# Patient Record
Sex: FEMALE | Race: BLACK OR AFRICAN AMERICAN | NOT HISPANIC OR LATINO | Employment: OTHER | URBAN - METROPOLITAN AREA
[De-identification: names, ages, dates, MRNs, and addresses within clinical notes are randomized per-mention and may not be internally consistent; named-entity substitution may affect disease eponyms.]

---

## 2023-04-11 LAB
ALBUMIN (MG/L) IN URINE: 10.8 MG/L
ALBUMIN/CREATININE (UG/MG) IN URINE: 20.7 UG/MG CRT (ref 0–30)
APPEARANCE, URINE: CLEAR
BACTERIA, URINE: ABNORMAL /HPF
BILIRUBIN, URINE: NEGATIVE
BLOOD, URINE: NEGATIVE
COLOR, URINE: YELLOW
CREATININE (MG/DL) IN URINE: 52.1 MG/DL (ref 20–320)
GLUCOSE, URINE: NEGATIVE MG/DL
KETONES, URINE: NEGATIVE MG/DL
LEUKOCYTE ESTERASE, URINE: NEGATIVE
NITRITE, URINE: POSITIVE
PH, URINE: 5 (ref 5–8)
PROTEIN, URINE: NEGATIVE MG/DL
RBC, URINE: <1 /HPF (ref 0–5)
SPECIFIC GRAVITY, URINE: 1.01 (ref 1–1.03)
SQUAMOUS EPITHELIAL CELLS, URINE: 2 /HPF
URIC ACID (URATE) CRYSTALS, URINE: ABNORMAL /HPF
UROBILINOGEN, URINE: <2 MG/DL (ref 0–1.9)
WBC, URINE: 2 /HPF (ref 0–5)

## 2023-04-12 LAB
ALANINE AMINOTRANSFERASE (SGPT) (U/L) IN SER/PLAS: 15 U/L (ref 7–45)
ALBUMIN (G/DL) IN SER/PLAS: 4.4 G/DL (ref 3.4–5)
ALKALINE PHOSPHATASE (U/L) IN SER/PLAS: 101 U/L (ref 33–136)
ANION GAP IN SER/PLAS: 11 MMOL/L (ref 10–20)
ASPARTATE AMINOTRANSFERASE (SGOT) (U/L) IN SER/PLAS: 15 U/L (ref 9–39)
BILIRUBIN TOTAL (MG/DL) IN SER/PLAS: 0.3 MG/DL (ref 0–1.2)
CALCIDIOL (25 OH VITAMIN D3) (NG/ML) IN SER/PLAS: 34 NG/ML
CALCIUM (MG/DL) IN SER/PLAS: 9.9 MG/DL (ref 8.6–10.6)
CARBON DIOXIDE, TOTAL (MMOL/L) IN SER/PLAS: 30 MMOL/L (ref 21–32)
CHLORIDE (MMOL/L) IN SER/PLAS: 102 MMOL/L (ref 98–107)
CHOLESTEROL (MG/DL) IN SER/PLAS: 285 MG/DL (ref 0–199)
CHOLESTEROL IN HDL (MG/DL) IN SER/PLAS: 74.5 MG/DL
CHOLESTEROL/HDL RATIO: 3.8
CREATININE (MG/DL) IN SER/PLAS: 0.82 MG/DL (ref 0.5–1.05)
ESTIMATED AVERAGE GLUCOSE FOR HBA1C: 197 MG/DL
GFR FEMALE: 73 ML/MIN/1.73M2
GLUCOSE (MG/DL) IN SER/PLAS: 159 MG/DL (ref 74–99)
HEMOGLOBIN A1C/HEMOGLOBIN TOTAL IN BLOOD: 8.5 %
LDL: 192 MG/DL (ref 0–99)
POTASSIUM (MMOL/L) IN SER/PLAS: 4.3 MMOL/L (ref 3.5–5.3)
PROTEIN TOTAL: 7.8 G/DL (ref 6.4–8.2)
SODIUM (MMOL/L) IN SER/PLAS: 139 MMOL/L (ref 136–145)
TRIGLYCERIDE (MG/DL) IN SER/PLAS: 94 MG/DL (ref 0–149)
UREA NITROGEN (MG/DL) IN SER/PLAS: 14 MG/DL (ref 6–23)
VLDL: 19 MG/DL (ref 0–40)

## 2023-04-14 LAB — URINE CULTURE: ABNORMAL

## 2023-05-17 LAB
APPEARANCE, URINE: CLEAR
BILIRUBIN, URINE: NEGATIVE
BLOOD, URINE: NEGATIVE
COLOR, URINE: YELLOW
GLUCOSE, URINE: NEGATIVE MG/DL
KETONES, URINE: NEGATIVE MG/DL
LEUKOCYTE ESTERASE, URINE: NEGATIVE
NITRITE, URINE: NEGATIVE
PH, URINE: 6 (ref 5–8)
PROTEIN, URINE: NEGATIVE MG/DL
SPECIFIC GRAVITY, URINE: 1.01 (ref 1–1.03)
UROBILINOGEN, URINE: <2 MG/DL (ref 0–1.9)

## 2023-05-20 LAB — URINE CULTURE: ABNORMAL

## 2023-08-21 LAB
ALBUMIN (G/DL) IN SER/PLAS: 4.5 G/DL (ref 3.4–5)
ALBUMIN (MG/L) IN URINE: 32.2 MG/L
ALBUMIN/CREATININE (UG/MG) IN URINE: 32.8 UG/MG CRT (ref 0–30)
ANION GAP IN SER/PLAS: 15 MMOL/L (ref 10–20)
CALCIUM (MG/DL) IN SER/PLAS: 10 MG/DL (ref 8.6–10.6)
CARBON DIOXIDE, TOTAL (MMOL/L) IN SER/PLAS: 26 MMOL/L (ref 21–32)
CHLORIDE (MMOL/L) IN SER/PLAS: 98 MMOL/L (ref 98–107)
CREATININE (MG/DL) IN SER/PLAS: 0.88 MG/DL (ref 0.5–1.05)
CREATININE (MG/DL) IN URINE: 98.3 MG/DL (ref 20–320)
ESTIMATED AVERAGE GLUCOSE FOR HBA1C: 223 MG/DL
GFR FEMALE: 67 ML/MIN/1.73M2
GLUCOSE (MG/DL) IN SER/PLAS: 222 MG/DL (ref 74–99)
HEMOGLOBIN A1C/HEMOGLOBIN TOTAL IN BLOOD: 9.4 %
PHOSPHATE (MG/DL) IN SER/PLAS: 3.7 MG/DL (ref 2.5–4.9)
POTASSIUM (MMOL/L) IN SER/PLAS: 4.3 MMOL/L (ref 3.5–5.3)
SODIUM (MMOL/L) IN SER/PLAS: 135 MMOL/L (ref 136–145)
UREA NITROGEN (MG/DL) IN SER/PLAS: 16 MG/DL (ref 6–23)

## 2023-08-22 LAB
CHOLESTEROL (MG/DL) IN SER/PLAS: 322 MG/DL (ref 0–199)
CHOLESTEROL IN HDL (MG/DL) IN SER/PLAS: 67.9 MG/DL
CHOLESTEROL/HDL RATIO: 4.7
LDL: 227 MG/DL (ref 0–99)
TRIGLYCERIDE (MG/DL) IN SER/PLAS: 135 MG/DL (ref 0–149)
VLDL: 27 MG/DL (ref 0–40)

## 2023-09-15 LAB
APPEARANCE, URINE: ABNORMAL
BILIRUBIN, URINE: NEGATIVE
BLOOD, URINE: ABNORMAL
COLOR, URINE: YELLOW
GLUCOSE, URINE: NEGATIVE MG/DL
KETONES, URINE: NEGATIVE MG/DL
LEUKOCYTE ESTERASE, URINE: ABNORMAL
NITRITE, URINE: NEGATIVE
PH, URINE: 5 (ref 5–8)
PROTEIN, URINE: NEGATIVE MG/DL
RBC, URINE: 3 /HPF (ref 0–5)
SPECIFIC GRAVITY, URINE: 1.01 (ref 1–1.03)
SQUAMOUS EPITHELIAL CELLS, URINE: <1 /HPF
UROBILINOGEN, URINE: <2 MG/DL (ref 0–1.9)
WBC CLUMPS, URINE: ABNORMAL /HPF
WBC, URINE: >182 /HPF (ref 0–5)

## 2023-09-17 LAB — URINE CULTURE: NORMAL

## 2023-09-19 PROBLEM — I20.89 EFFORT ANGINA (CMS-HCC): Status: ACTIVE | Noted: 2023-09-19

## 2023-09-19 PROBLEM — F17.200 NICOTINE DEPENDENCE: Status: ACTIVE | Noted: 2023-09-19

## 2023-09-19 PROBLEM — R31.21 ASYMPTOMATIC MICROSCOPIC HEMATURIA: Status: ACTIVE | Noted: 2023-09-19

## 2023-09-19 PROBLEM — I25.10 CORONARY ARTERY DISEASE: Status: ACTIVE | Noted: 2023-09-19

## 2023-09-19 PROBLEM — R30.0 DYSURIA: Status: ACTIVE | Noted: 2023-09-19

## 2023-09-19 PROBLEM — M35.01 BILATERAL KERATITIS SICCA (MULTI): Status: ACTIVE | Noted: 2023-09-19

## 2023-09-19 PROBLEM — H35.363 PERIPHERAL DRUSEN OF BOTH EYES: Status: ACTIVE | Noted: 2023-09-19

## 2023-09-19 PROBLEM — H16.141 PUNCTATE KERATITIS OF RIGHT EYE: Status: ACTIVE | Noted: 2023-09-19

## 2023-09-19 PROBLEM — H52.13 MYOPIA OF BOTH EYES WITH ASTIGMATISM AND PRESBYOPIA: Status: ACTIVE | Noted: 2023-09-19

## 2023-09-19 PROBLEM — E78.5 HYPERLIPIDEMIA: Status: ACTIVE | Noted: 2023-09-19

## 2023-09-19 PROBLEM — H52.203 MYOPIA OF BOTH EYES WITH ASTIGMATISM AND PRESBYOPIA: Status: ACTIVE | Noted: 2023-09-19

## 2023-09-19 PROBLEM — M19.90 OSTEOARTHRITIS: Status: ACTIVE | Noted: 2023-09-19

## 2023-09-19 PROBLEM — I10 HYPERTENSION: Status: ACTIVE | Noted: 2023-09-19

## 2023-09-19 PROBLEM — E66.9 OBESITY (BMI 30-39.9): Status: ACTIVE | Noted: 2023-09-19

## 2023-09-19 PROBLEM — E11.9 DIABETES MELLITUS TYPE 2 WITHOUT RETINOPATHY (MULTI): Status: ACTIVE | Noted: 2023-09-19

## 2023-09-19 PROBLEM — R31.0 GROSS HEMATURIA: Status: ACTIVE | Noted: 2023-09-19

## 2023-09-19 PROBLEM — S69.92XA: Status: ACTIVE | Noted: 2023-09-19

## 2023-09-19 PROBLEM — N13.30 HYDRONEPHROSIS: Status: ACTIVE | Noted: 2023-09-19

## 2023-09-19 PROBLEM — H16.141: Status: ACTIVE | Noted: 2023-09-19

## 2023-09-19 PROBLEM — L91.0 KELOID: Status: ACTIVE | Noted: 2019-08-07

## 2023-09-19 PROBLEM — L73.1 PSEUDOFOLLICULITIS BARBAE: Status: ACTIVE | Noted: 2019-08-07

## 2023-09-19 PROBLEM — M85.80 OSTEOPENIA: Status: ACTIVE | Noted: 2023-09-19

## 2023-09-19 PROBLEM — M17.0 PRIMARY OSTEOARTHRITIS OF BOTH KNEES: Status: ACTIVE | Noted: 2023-09-19

## 2023-09-19 PROBLEM — H16.223 BILATERAL KERATITIS SICCA: Status: ACTIVE | Noted: 2023-09-19

## 2023-09-19 PROBLEM — H25.13 NUCLEAR SCLEROSIS OF BOTH EYES: Status: ACTIVE | Noted: 2023-09-19

## 2023-09-19 PROBLEM — H25.013 CORTICAL AGE-RELATED CATARACT OF BOTH EYES: Status: ACTIVE | Noted: 2023-09-19

## 2023-09-19 PROBLEM — E11.9 DIABETES MELLITUS (MULTI): Status: ACTIVE | Noted: 2023-09-19

## 2023-09-19 PROBLEM — H52.203 ASTIGMATISM, BILATERAL: Status: ACTIVE | Noted: 2023-09-19

## 2023-09-19 PROBLEM — N28.89: Status: ACTIVE | Noted: 2023-09-19

## 2023-09-19 PROBLEM — I25.10 ARTERIOSCLEROSIS OF CORONARY ARTERY: Status: ACTIVE | Noted: 2023-09-19

## 2023-09-19 PROBLEM — H40.1131 PRIMARY OPEN ANGLE GLAUCOMA OF BOTH EYES, MILD STAGE: Status: ACTIVE | Noted: 2023-09-19

## 2023-09-19 PROBLEM — H52.4 MYOPIA OF BOTH EYES WITH ASTIGMATISM AND PRESBYOPIA: Status: ACTIVE | Noted: 2023-09-19

## 2023-09-19 PROBLEM — F20.9 SCHIZOPHRENIA (MULTI): Status: ACTIVE | Noted: 2023-09-19

## 2023-09-19 PROBLEM — H35.89 MACULAR RPE MOTTLING: Status: ACTIVE | Noted: 2023-09-19

## 2023-09-19 RX ORDER — ARIPIPRAZOLE 30 MG/1
1 TABLET ORAL DAILY
COMMUNITY
Start: 2018-02-07 | End: 2023-10-24 | Stop reason: SDUPTHER

## 2023-09-19 RX ORDER — ALBUTEROL SULFATE 90 UG/1
2 AEROSOL, METERED RESPIRATORY (INHALATION) EVERY 4 HOURS PRN
COMMUNITY
Start: 2020-04-10

## 2023-09-19 RX ORDER — METFORMIN HYDROCHLORIDE EXTENDED-RELEASE TABLETS 1000 MG/1
1 TABLET, FILM COATED, EXTENDED RELEASE ORAL 2 TIMES DAILY
COMMUNITY
Start: 2019-10-18 | End: 2023-10-03 | Stop reason: WASHOUT

## 2023-09-19 RX ORDER — GLIPIZIDE 5 MG/1
2 TABLET ORAL 2 TIMES DAILY
COMMUNITY
Start: 2015-11-07

## 2023-09-19 RX ORDER — DOXYCYCLINE HYCLATE 100 MG
1 TABLET ORAL
COMMUNITY
Start: 2017-06-30

## 2023-09-19 RX ORDER — GLIMEPIRIDE 2 MG/1
1 TABLET ORAL 2 TIMES DAILY
COMMUNITY
Start: 2021-07-16

## 2023-09-19 RX ORDER — HYDROCHLOROTHIAZIDE 25 MG/1
1 TABLET ORAL DAILY
COMMUNITY
Start: 2015-11-07

## 2023-09-19 RX ORDER — BLOOD SUGAR DIAGNOSTIC
STRIP MISCELLANEOUS 3 TIMES DAILY
COMMUNITY
Start: 2019-12-23 | End: 2023-11-16

## 2023-09-19 RX ORDER — BIMATOPROST 0.1 MG/ML
1 SOLUTION/ DROPS OPHTHALMIC NIGHTLY
COMMUNITY
Start: 2015-01-07 | End: 2023-10-19

## 2023-09-19 RX ORDER — GLIMEPIRIDE 4 MG/1
1 TABLET ORAL 2 TIMES DAILY
COMMUNITY

## 2023-09-19 RX ORDER — ROSUVASTATIN CALCIUM 10 MG/1
1 TABLET, COATED ORAL NIGHTLY
COMMUNITY
Start: 2021-12-20

## 2023-09-19 RX ORDER — ARIPIPRAZOLE 20 MG/1
1 TABLET ORAL DAILY
COMMUNITY
End: 2023-10-24 | Stop reason: DRUGHIGH

## 2023-09-19 RX ORDER — ASPIRIN 81 MG/1
1 TABLET ORAL DAILY
COMMUNITY
Start: 2014-09-19

## 2023-09-19 RX ORDER — SIMVASTATIN 40 MG/1
1 TABLET, FILM COATED ORAL NIGHTLY
COMMUNITY

## 2023-09-19 RX ORDER — ROSUVASTATIN CALCIUM 40 MG/1
1 TABLET, COATED ORAL DAILY
COMMUNITY

## 2023-09-19 RX ORDER — AMLODIPINE BESYLATE 10 MG/1
1 TABLET ORAL DAILY
COMMUNITY
Start: 2022-04-26 | End: 2023-12-11

## 2023-09-19 RX ORDER — LANCETS
EACH MISCELLANEOUS 3 TIMES DAILY
COMMUNITY

## 2023-09-19 RX ORDER — ONDANSETRON 4 MG/1
1-2 TABLET, ORALLY DISINTEGRATING ORAL EVERY 6 HOURS PRN
COMMUNITY
Start: 2017-05-04

## 2023-10-03 DIAGNOSIS — E11.9 DIABETES MELLITUS TYPE 2 WITHOUT RETINOPATHY (MULTI): Primary | ICD-10-CM

## 2023-10-06 ENCOUNTER — TELEPHONE (OUTPATIENT)
Dept: PRIMARY CARE | Facility: HOSPITAL | Age: 78
End: 2023-10-06
Payer: COMMERCIAL

## 2023-10-10 RX ORDER — METFORMIN HYDROCHLORIDE 500 MG/1
1000 TABLET, EXTENDED RELEASE ORAL
Qty: 360 TABLET | Refills: 3 | Status: SHIPPED | OUTPATIENT
Start: 2023-10-10 | End: 2024-10-09

## 2023-10-19 DIAGNOSIS — H40.1131 PRIMARY OPEN-ANGLE GLAUCOMA, BILATERAL, MILD STAGE: ICD-10-CM

## 2023-10-19 RX ORDER — BIMATOPROST 0.1 MG/ML
SOLUTION/ DROPS OPHTHALMIC
Qty: 2.5 ML | Refills: 6 | Status: SHIPPED | OUTPATIENT
Start: 2023-10-19 | End: 2023-12-05 | Stop reason: SDUPTHER

## 2023-10-24 ENCOUNTER — OFFICE VISIT (OUTPATIENT)
Dept: BEHAVIORAL HEALTH | Facility: CLINIC | Age: 78
End: 2023-10-24
Payer: COMMERCIAL

## 2023-10-24 VITALS
WEIGHT: 168.4 LBS | HEART RATE: 79 BPM | TEMPERATURE: 98.6 F | SYSTOLIC BLOOD PRESSURE: 117 MMHG | DIASTOLIC BLOOD PRESSURE: 74 MMHG | RESPIRATION RATE: 18 BRPM | HEIGHT: 60 IN | BODY MASS INDEX: 33.06 KG/M2

## 2023-10-24 DIAGNOSIS — F20.3 UNDIFFERENTIATED SCHIZOPHRENIA (MULTI): ICD-10-CM

## 2023-10-24 PROCEDURE — 1036F TOBACCO NON-USER: CPT

## 2023-10-24 PROCEDURE — 99214 OFFICE O/P EST MOD 30 MIN: CPT

## 2023-10-24 PROCEDURE — 3078F DIAST BP <80 MM HG: CPT

## 2023-10-24 PROCEDURE — 1160F RVW MEDS BY RX/DR IN RCRD: CPT

## 2023-10-24 PROCEDURE — 3074F SYST BP LT 130 MM HG: CPT

## 2023-10-24 PROCEDURE — 1159F MED LIST DOCD IN RCRD: CPT

## 2023-10-24 PROCEDURE — 1126F AMNT PAIN NOTED NONE PRSNT: CPT

## 2023-10-24 RX ORDER — ARIPIPRAZOLE 30 MG/1
30 TABLET ORAL DAILY
Qty: 90 TABLET | Refills: 1 | Status: SHIPPED | OUTPATIENT
Start: 2023-10-24 | End: 2024-04-09 | Stop reason: SDUPTHER

## 2023-10-24 ASSESSMENT — ENCOUNTER SYMPTOMS
OCCASIONAL FEELINGS OF UNSTEADINESS: 1
CARDIOVASCULAR NEGATIVE: 1
DEPRESSION: 0
GASTROINTESTINAL NEGATIVE: 1
LOSS OF SENSATION IN FEET: 0
RESPIRATORY NEGATIVE: 1
CONSTITUTIONAL NEGATIVE: 1

## 2023-10-24 NOTE — PATIENT INSTRUCTIONS
Plan  - Continue Abilify 30 mg daily  - Follow up with physical health providers as scheduled  - monitor for abnormal mouth movements you cannot control. If worsening report to this provider.  - May follow up sooner if experiences worsening symptoms by calling  Psychiatry at [phone]  *Please call the [employer] crisis hotline at 478 or call 911 or go to your closest Emergency Room if you feel worse. This includes thoughts of hurting yourself or anyone else, or having other troubles such as hearing voices, seeing visions, or having new and scary thoughts about the people around you.      Ways to Help Prevent Falls at Home    Quick Tips   ? Ask for help if you need it. Most people want to help!   ? Get up slowly after sitting or laying down   ? Wear a medical alert device or keep cell phone in your pocket   ? Use night lights, especially areas near a bathroom   ? Keep the items you use often within reach on a small stool or end table   ? Use an assistive device such as walker or cane, as directed by provider/physical therapy   ? Use a non-slip mat and grab bars in your bathroom. Look for home health sections for best options     Other Areas to Focus On   ? Exercise and nutrition: Regular exercise or taking a falls prevention class are great ways improve strength and balance. Don’t forget to stay hydrated and bring a snack!   ? Medicine side effects: Some medicines can make you sleepy or dizzy, which could cause a fall. Ask your healthcare provider about the side effects your medicines could cause. Be sure to let them know if you take any vitamins or supplements as well.   ? Tripping hazards: Remove items you could trip on, such as loose mats, rugs, cords, and clutter. Wear closed toe shoes with rubber soles.   ? Health and wellness: Get regular checkups with your healthcare provider, plus routine vision and hearing screenings. Talk with your healthcare provider about:   o Your medicines and the possible side  effects - bring them in a bag if that is easier!   o Problems with balance or feeling dizzy   o Ways to promote bone health, such as Vitamin D and calcium supplements   o Questions or concerns about falling     *Ask your healthcare team if you have questions     ©[facility], 2022
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
## 2023-11-20 ENCOUNTER — APPOINTMENT (OUTPATIENT)
Dept: PRIMARY CARE | Facility: HOSPITAL | Age: 78
End: 2023-11-20
Payer: COMMERCIAL

## 2023-12-05 ENCOUNTER — OFFICE VISIT (OUTPATIENT)
Dept: OPHTHALMOLOGY | Facility: CLINIC | Age: 78
End: 2023-12-05
Payer: COMMERCIAL

## 2023-12-05 DIAGNOSIS — H25.813 COMBINED FORMS OF AGE-RELATED CATARACT OF BOTH EYES: Primary | ICD-10-CM

## 2023-12-05 DIAGNOSIS — H40.1131 PRIMARY OPEN-ANGLE GLAUCOMA, BILATERAL, MILD STAGE: ICD-10-CM

## 2023-12-05 DIAGNOSIS — H04.123 DRY EYES: ICD-10-CM

## 2023-12-05 PROCEDURE — 92012 INTRM OPH EXAM EST PATIENT: CPT | Performed by: OPHTHALMOLOGY

## 2023-12-05 RX ORDER — BIMATOPROST 0.1 MG/ML
1 SOLUTION/ DROPS OPHTHALMIC NIGHTLY
Qty: 7.5 ML | Refills: 3 | Status: SHIPPED | OUTPATIENT
Start: 2023-12-05 | End: 2024-01-04

## 2023-12-05 ASSESSMENT — VISUAL ACUITY
METHOD: SNELLEN - LINEAR
OD_PH_CC: 20/50
OS_CC: 20/40
OD_CC: 20/60

## 2023-12-05 ASSESSMENT — TONOMETRY
OS_IOP_MMHG: 22
OD_IOP_MMHG: 22
IOP_METHOD: GOLDMANN APPLANATION

## 2023-12-05 ASSESSMENT — PACHYMETRY
OS_CT(UM): 593
OD_CT(UM): 579

## 2023-12-05 NOTE — PROGRESS NOTES
Assessment/Plan   Diagnoses and all orders for this visit:  Combined forms of age-related cataract of both eyes  -refer to see Dr. Bess  -Refer to Dr. Bess for cataract evaluation and management  Co-management discussed with patient.  Patient prefers to return to my care for the post-operative management    Primary open-angle glaucoma, bilateral, mild stage  -     bimatoprost (Lumigan) 0.01 % ophthalmic solution; Administer 1 drop into both eyes once daily at bedtime.  -continue with Lumigan both eyes qhs    Dry eyes  -Start artificial tears both eyes (OU) qid  -stress compliance    Return in  3  month(s) for follow up or sooner if having any problems

## 2023-12-11 DIAGNOSIS — I10 PRIMARY HYPERTENSION: Primary | ICD-10-CM

## 2023-12-11 RX ORDER — AMLODIPINE BESYLATE 10 MG/1
10 TABLET ORAL DAILY
Qty: 90 TABLET | Refills: 3 | Status: SHIPPED | OUTPATIENT
Start: 2023-12-11

## 2023-12-18 ENCOUNTER — APPOINTMENT (OUTPATIENT)
Dept: PRIMARY CARE | Facility: HOSPITAL | Age: 78
End: 2023-12-18
Payer: COMMERCIAL

## 2023-12-20 RX ORDER — NITROFURANTOIN (MACROCRYSTALS) 100 MG/1
100 CAPSULE ORAL EVERY 12 HOURS
COMMUNITY
Start: 2023-05-19

## 2023-12-20 RX ORDER — METFORMIN HYDROCHLORIDE 500 MG/1
1000 TABLET ORAL 2 TIMES DAILY
COMMUNITY
Start: 2023-11-19

## 2024-01-22 ENCOUNTER — APPOINTMENT (OUTPATIENT)
Dept: OPHTHALMOLOGY | Facility: CLINIC | Age: 79
End: 2024-01-22
Payer: COMMERCIAL

## 2024-01-23 ENCOUNTER — APPOINTMENT (OUTPATIENT)
Dept: BEHAVIORAL HEALTH | Facility: CLINIC | Age: 79
End: 2024-01-23
Payer: COMMERCIAL

## 2024-01-31 ENCOUNTER — APPOINTMENT (OUTPATIENT)
Dept: ENDOCRINOLOGY | Facility: HOSPITAL | Age: 79
End: 2024-01-31
Payer: COMMERCIAL

## 2024-02-12 ENCOUNTER — APPOINTMENT (OUTPATIENT)
Dept: OPHTHALMOLOGY | Facility: CLINIC | Age: 79
End: 2024-02-12
Payer: COMMERCIAL

## 2024-03-05 ENCOUNTER — OFFICE VISIT (OUTPATIENT)
Dept: OPHTHALMOLOGY | Facility: CLINIC | Age: 79
End: 2024-03-05
Payer: COMMERCIAL

## 2024-03-05 DIAGNOSIS — E11.9 DIABETES MELLITUS TYPE 2 WITHOUT RETINOPATHY (MULTI): ICD-10-CM

## 2024-03-05 DIAGNOSIS — H04.123 DRY EYES: ICD-10-CM

## 2024-03-05 DIAGNOSIS — M35.01 BILATERAL KERATITIS SICCA (MULTI): ICD-10-CM

## 2024-03-05 DIAGNOSIS — H25.813 COMBINED FORMS OF AGE-RELATED CATARACT OF BOTH EYES: ICD-10-CM

## 2024-03-05 DIAGNOSIS — H40.1131 PRIMARY OPEN ANGLE GLAUCOMA OF BOTH EYES, MILD STAGE: Primary | ICD-10-CM

## 2024-03-05 PROCEDURE — 92012 INTRM OPH EXAM EST PATIENT: CPT | Performed by: OPHTHALMOLOGY

## 2024-03-05 ASSESSMENT — PACHYMETRY
OS_CT(UM): 593
OD_CT(UM): 579

## 2024-03-05 ASSESSMENT — TONOMETRY
IOP_METHOD: GOLDMANN APPLANATION
OD_IOP_MMHG: 18
OS_IOP_MMHG: 18

## 2024-03-05 ASSESSMENT — EXTERNAL EXAM - RIGHT EYE: OD_EXAM: NORMAL

## 2024-03-05 ASSESSMENT — VISUAL ACUITY
METHOD: SNELLEN - LINEAR
OD_CC: 20/60-1
OS_CC: 20/40

## 2024-03-05 ASSESSMENT — EXTERNAL EXAM - LEFT EYE: OS_EXAM: NORMAL

## 2024-03-05 ASSESSMENT — SLIT LAMP EXAM - LIDS
COMMENTS: NORMAL
COMMENTS: NORMAL

## 2024-03-05 NOTE — PROGRESS NOTES
Assessment/Plan   Diagnoses and all orders for this visit:  Primary open angle glaucoma of both eyes, mild stage  -     bimatoprost (Lumigan) 0.01 % ophthalmic solution; Administer 1 drop into both eyes once daily at bedtime.  -continue with Lumigan both eyes qhs    Combined forms of age-related cataract of both eyes  -Refer to Dr. Bess for cataract evaluation and management  Co-management discussed with patient.  Patient prefers to return to my care for the post-operative management    Bilateral keratitis sicca (CMS/[facility])  -continue with artificial tears both eyes qid    Diabetes mellitus type 2 without retinopathy (CMS/[facility])  Yearly exams recommended    Dry eyes  -Start artificial tears both eyes (OU) qid  -stress compliance    Return in  3  month(s) for follow up or sooner if having any problems

## 2024-04-09 ENCOUNTER — OFFICE VISIT (OUTPATIENT)
Dept: BEHAVIORAL HEALTH | Facility: CLINIC | Age: 79
End: 2024-04-09
Payer: COMMERCIAL

## 2024-04-09 VITALS
DIASTOLIC BLOOD PRESSURE: 74 MMHG | HEIGHT: 60 IN | RESPIRATION RATE: 16 BRPM | WEIGHT: 170.1 LBS | SYSTOLIC BLOOD PRESSURE: 129 MMHG | TEMPERATURE: 98 F | BODY MASS INDEX: 33.4 KG/M2 | HEART RATE: 100 BPM

## 2024-04-09 DIAGNOSIS — F20.3 UNDIFFERENTIATED SCHIZOPHRENIA (MULTI): ICD-10-CM

## 2024-04-09 PROCEDURE — 1160F RVW MEDS BY RX/DR IN RCRD: CPT

## 2024-04-09 PROCEDURE — 1159F MED LIST DOCD IN RCRD: CPT

## 2024-04-09 PROCEDURE — 1036F TOBACCO NON-USER: CPT

## 2024-04-09 PROCEDURE — 3078F DIAST BP <80 MM HG: CPT

## 2024-04-09 PROCEDURE — 99214 OFFICE O/P EST MOD 30 MIN: CPT

## 2024-04-09 PROCEDURE — 3074F SYST BP LT 130 MM HG: CPT

## 2024-04-09 RX ORDER — ARIPIPRAZOLE 30 MG/1
30 TABLET ORAL DAILY
Qty: 90 TABLET | Refills: 1 | Status: SHIPPED | OUTPATIENT
Start: 2024-04-09

## 2024-04-09 NOTE — PROGRESS NOTES
Outpatient Psychiatry- Follow up visit    Subjective   Lou Mata, a 78 y.o. female, presenting for follow up visit for   Chief Complaint   Patient presents with    Schizophrenia        HPI:  Lou states her mood is good, sometimes gets angry over the hallucinations. Wonders why they happen. Auditory hallucinations, hears voices like they are coming through the walls. Will occur in the evening mostly, sometimes in the morning. Will go away overnight.   Lou will listen to a spiritual station which will help them fade. Hallucinations are directed towards her, talking about her but no command hallucinations.   Otherwise mood is good, sometimes feels depressed, tries not to let it overtake her. Denies lingering depressive sx. Denies SI/HI.     Stays active, decorates her house. Denies problems with anxiety and ruminating thoughts.    Goes to bed at 11 until three, cannot fall back asleep.   Will toss and turn the rest of the night. May doze in front of the TV during the day for approx 30 min.    Has some EPS, with mouth movements, does not feel it is bothersome.     Psychiatric Review Of Systems:  Depressive Symptoms: negative  Manic Symptoms: negative  Anxiety Symptoms: Negative  Psychotic Symptoms: auditory hallucinations  Trauma Symptoms: None  Other Symptoms/Concerns:Other: (comments) none reported  Delirium/Altered Mental Status Symptoms:Other: (comment) WNL    Current Medications:    Current Outpatient Medications:     OneTouch Ultra Test strip, USE AS DIRECTED 3 TIMES A DAY, Disp: 300 strip, Rfl: 2    albuterol 90 mcg/actuation inhaler, Inhale 2 puffs every 4 hours if needed for shortness of breath., Disp: , Rfl:     amLODIPine (Norvasc) 10 mg tablet, TAKE 1 TABLET BY MOUTH EVERY DAY, Disp: 90 tablet, Rfl: 3    ARIPiprazole (Abilify) 30 mg tablet, Take 1 tablet (30 mg) by mouth once daily., Disp: 90 tablet, Rfl: 1    aspirin 81 mg EC tablet, Take 1 tablet (81 mg) by mouth once daily., Disp: , Rfl:     benzoyl  peroxide 5 % lotion, 1 Application., Disp: , Rfl:     doxycycline (Vibra-Tabs) 100 mg tablet, 1 tablet (100 mg)., Disp: , Rfl:     glimepiride (Amaryl) 2 mg tablet, Take 1 tablet (2 mg) by mouth 2 times a day., Disp: , Rfl:     glimepiride (Amaryl) 4 mg tablet, Take 1 tablet (4 mg) by mouth 2 times a day., Disp: , Rfl:     glipiZIDE (Glucotrol) 5 mg tablet, Take 2 tablets (10 mg) by mouth twice a day., Disp: , Rfl:     hydroCHLOROthiazide (HYDRODiuril) 25 mg tablet, Take 1 tablet (25 mg) by mouth once daily., Disp: , Rfl:     lancets (OneTouch UltraSoft Lancets) misc, 3 times a day. Use to test blood sugar, Disp: , Rfl:     metFORMIN (Glucophage) 500 mg tablet, Take 2 tablets (1,000 mg) by mouth 2 times a day., Disp: , Rfl:     metFORMIN XR (Glucophage-XR) 500 mg 24 hr tablet, Take 2 tablets (1,000 mg) by mouth 2 times a day with meals. Do not crush, chew, or split., Disp: 360 tablet, Rfl: 3    nitrofurantoin (Macrodantin) 100 mg capsule, Take 1 capsule (100 mg) by mouth every 12 hours., Disp: , Rfl:     ondansetron ODT (Zofran-ODT) 4 mg disintegrating tablet, Take 1-2 tablets (4-8 mg) by mouth every 6 hours if needed for vomiting or nausea., Disp: , Rfl:     rosuvastatin (Crestor) 10 mg tablet, Take 1 tablet (10 mg) by mouth once daily at bedtime., Disp: , Rfl:     rosuvastatin (Crestor) 40 mg tablet, Take 1 tablet (40 mg) by mouth once daily., Disp: , Rfl:     simvastatin (Zocor) 40 mg tablet, Take 1 tablet (40 mg) by mouth once daily at bedtime., Disp: , Rfl:     SITagliptin phosphate (Januvia) 100 mg tablet, Take 1 tablet (100 mg) by mouth once daily., Disp: , Rfl:     Medical History:  Past Medical History:   Diagnosis Date    Age-related nuclear cataract, unspecified eye 11/30/2016    Nuclear sclerosis    Cellulitis of head (any part, except face) 11/26/2014    Abscess or cellulitis of scalp    Disorder of the skin and subcutaneous tissue, unspecified 04/19/2016    Skin lesion    Essential (primary)  "hypertension 12/15/2022    Hypertension    Myopia, bilateral 01/09/2015    Bilateral myopia    Other specified personal risk factors, not elsewhere classified     At risk of UTI    Pelvic and perineal pain     Pelvic pain    Personal history of diseases of the skin and subcutaneous tissue 04/19/2016    History of furuncle    Personal history of other diseases of the nervous system and sense organs 11/30/2016    History of keratitis    Personal history of other mental and behavioral disorders 12/29/2020    History of anxiety    Presbyopia 01/09/2015    Presbyopia OU    Primary open-angle glaucoma, unspecified eye, stage unspecified 05/18/2016    Glaucoma primary, open angle    Type 2 diabetes mellitus without complications (Multi) 12/15/2022    Diabetes mellitus    Unspecified abdominal pain 05/02/2017    Abdominal cramps       Past Psychiatric History:   Onset History: Schizophrenia approx 1986.   Inpatient history: 3-4 times for schizophrenia, last time approx 10 yrs ago.   Suicide attempts/Self-Harm/Ideation History: None   Current providers: None   Past providers: Dr Cha, others she cannot remember.   Past medication trials: Haldol, Caplyta (did not like), Abilify, Zyprexa, more she cannot remember.     Family psychiatric history:  Mother    · Family history of Alcohol abuse  Social History:   Upbringing: Born and raised in [place]. Moved to [place] when 13 yo. One older sister and one younger brother. Parents , childhood was \"up and down, parents fought\" got  when pt 4 yo.  Trauma: Denies hx of trauma  Education: Finished 11th grade, denies learning disabilities or developmental delays  Work: Worked various jobs; worked in a bank, laundry facility, office work  Marital Status:  but   Children: Three children, two female, one male  Living situation: Lives alone, rent apartment  : Denies  Legal: Denies  Access to Weapons: No firearms in household  Guardian/POA/Payee: " " self    Substance Use History:  Tobacco use: former smoker; Started smoking in her 40's, quit 3 yrs ago. Highest use 1.5 PPD   Use of alcohol: denied; Used to drink heavily, slowed down in her 40's, stopped drinking 2-3 yrs ago.   Use of caffeine:   Use of other substances: denies; used to use illicit drugs, quit 15 yrs ago  Legal consequences of substance use: denies  Substance use disorder treatment: n/a    Record Review: brief     Medical Review Of Systems:  A comprehensive review of systems was negative.    MEDICAL HISTORY  -PCP: Georgia Avitia MD  -TBI/head trauma/LOC/seizure hx: denies      Objective   Mental Status Exam  Appearance: Lou is dressed appropriately and is neat and clean in appearance.   Attitude: Calm, cooperative, and engaged in conversation.  Behavior: Appropriate eye contact.   Motor Activity: No psychomotor agitation or retardation. Lou has a tremor in her right leg and she reports EPS with mouth movements. They are intermittent and she is able to stop the movements.  Speech: Regular rate, rhythm, volume. Spontaneous, no pressured speech.  Mood: \"good\"  Affect: Euthymic, full range, mood congruent.  Thought Process: Linear, logical, and goal-directed. No loose associations or gross thought disorganization.  Thought Content: Denied current suicidal ideation or thoughts of harm to self, denied homicidal ideation or thoughts of harm to others. No delusional thinking elicited. No perseverations or obsessions identified.   Perception: Lou has auditory hallucinations, denies visual hallucinations. did not appear to be responding to hallucinatory stimuli.   Cognition: Alert, oriented x3. Preserved attention span and concentration, recent and remote memory. Adequate fund of knowledge. No deficits in language.   Insight: Good, in regards to understanding mental health condition  Judgement: Intact    Vitals:  Vitals:    04/09/24 1331   BP: 129/74   Pulse: 100   Resp: 16   Temp: 36.7 °C (98 °F)       Risk " Assessment:  SI/HI ASSESSMENT  -Risk Assessment: Lou Mata is currently a low acute risk of suicide and self-harm due to no past suicide attempt(s) and not currently endorsing thoughts of suicide. Lou Mata is currently a low acute risk of violence and harm to others due to no past history of violence and not currently threatening others.  -Suicidal Risk Factors: unmarried/single, age <19 years and >65 years, living alone/lack of social support, and current psychiatric illness  -Violence Risk Factors: none  -Protective Factors: social support/connectedness, positive family relationships, hopefulness/future orientation, and no SI, no hx of SA, treatment compliant, willing to seek help.  -Plan to Reduce Risk: Establish medication regimen and outpatient follow-up care    Lou was seen today for schizophrenia.  Diagnoses and all orders for this visit:  Undifferentiated schizophrenia (Multi)  -     ARIPiprazole (Abilify) 30 mg tablet; Take 1 tablet (30 mg) by mouth once daily.       Impression:  Lou is doing well; she continues to have auditory hallucinations intermittently.  She sometimes gets angry that she has them but is able to manage them well.  Lou has a tremor and EPS with abnormal mouth movements that are intermittent.  Discussed the option of changing medication or adding a medication to help control symptoms.  Advised advised to keep track of the mouth movements and how frequent they are and if she is able to stop them.  This provider did not visualize that at this time.  Plan to follow-up in 6 weeks to reevaluate and consider medication change.  Will continue current medication regimen for now.    Plan/Recommendations:  Medications:    -Continue Abilify 30 mg daily for hallucinations  Follow up: May 28th 2:00  Call  Psychiatry at [phone] with issues.  For [facility] residents, [employer] is a 24/7 hotline you can call for assistance [[phone]]. Please call 725/012 or go to your  closest Emergency Room if you feel unsafe. This includes thoughts of hurting yourself or anyone else, or having other troubles such as hearing voices, seeing visions, or having new and scary thoughts about the people around you.    Review with patient: Treatment plan reviewed with the patient.  Medication risks/benefit reviewed with the patient    Time Spent:  Prep time: 2 min  Direct patient time: 20 min  Documentation time: 10 min  Total time: 32 min    VIANEY Bansal-CNP

## 2024-04-12 NOTE — PATIENT INSTRUCTIONS
Plan/Recommendations:  Medications:    -Continue Abilify 30 mg daily for hallucinations  Follow up: May 28th 2:00  Call  Psychiatry at [phone] with issues.  For [facility] residents, [employer] is a 24/7 hotline you can call for assistance [[phone]]. Please call 888/445 or go to your closest Emergency Room if you feel unsafe. This includes thoughts of hurting yourself or anyone else, or having other troubles such as hearing voices, seeing visions, or having new and scary thoughts about the people around you.

## 2024-06-11 ENCOUNTER — APPOINTMENT (OUTPATIENT)
Dept: OPHTHALMOLOGY | Facility: CLINIC | Age: 79
End: 2024-06-11
Payer: COMMERCIAL

## 2024-06-27 ENCOUNTER — OFFICE VISIT (OUTPATIENT)
Dept: ENDOCRINOLOGY | Facility: HOSPITAL | Age: 79
End: 2024-06-27
Payer: COMMERCIAL

## 2024-06-27 VITALS
TEMPERATURE: 96.3 F | HEART RATE: 82 BPM | OXYGEN SATURATION: 100 % | BODY MASS INDEX: 33.67 KG/M2 | HEIGHT: 60 IN | WEIGHT: 171.5 LBS | SYSTOLIC BLOOD PRESSURE: 143 MMHG | DIASTOLIC BLOOD PRESSURE: 83 MMHG

## 2024-06-27 DIAGNOSIS — E78.2 MIXED HYPERLIPIDEMIA: ICD-10-CM

## 2024-06-27 DIAGNOSIS — E11.9 DIABETES MELLITUS TYPE 2 WITHOUT RETINOPATHY (MULTI): Primary | ICD-10-CM

## 2024-06-27 LAB
ALBUMIN SERPL BCP-MCNC: 4.4 G/DL (ref 3.4–5)
ANION GAP SERPL CALC-SCNC: 15 MMOL/L (ref 10–20)
BUN SERPL-MCNC: 14 MG/DL (ref 6–23)
CALCIUM SERPL-MCNC: 9.7 MG/DL (ref 8.6–10.6)
CHLORIDE SERPL-SCNC: 102 MMOL/L (ref 98–107)
CHOLEST SERPL-MCNC: 288 MG/DL (ref 0–199)
CHOLESTEROL/HDL RATIO: 4.5
CO2 SERPL-SCNC: 25 MMOL/L (ref 21–32)
CREAT SERPL-MCNC: 0.92 MG/DL (ref 0.5–1.05)
EGFRCR SERPLBLD CKD-EPI 2021: 64 ML/MIN/1.73M*2
EST. AVERAGE GLUCOSE BLD GHB EST-MCNC: 180 MG/DL
GLUCOSE BLD MANUAL STRIP-MCNC: 153 MG/DL (ref 74–99)
GLUCOSE SERPL-MCNC: 136 MG/DL (ref 74–99)
HBA1C MFR BLD: 7.9 %
HDLC SERPL-MCNC: 63.3 MG/DL
LDLC SERPL CALC-MCNC: 204 MG/DL
NON HDL CHOLESTEROL: 225 MG/DL (ref 0–149)
PHOSPHATE SERPL-MCNC: 3.6 MG/DL (ref 2.5–4.9)
POTASSIUM SERPL-SCNC: 4.4 MMOL/L (ref 3.5–5.3)
SODIUM SERPL-SCNC: 138 MMOL/L (ref 136–145)
TRIGL SERPL-MCNC: 106 MG/DL (ref 0–149)
VLDL: 21 MG/DL (ref 0–40)

## 2024-06-27 PROCEDURE — 3079F DIAST BP 80-89 MM HG: CPT | Performed by: STUDENT IN AN ORGANIZED HEALTH CARE EDUCATION/TRAINING PROGRAM

## 2024-06-27 PROCEDURE — 99214 OFFICE O/P EST MOD 30 MIN: CPT | Performed by: STUDENT IN AN ORGANIZED HEALTH CARE EDUCATION/TRAINING PROGRAM

## 2024-06-27 PROCEDURE — 3077F SYST BP >= 140 MM HG: CPT | Performed by: STUDENT IN AN ORGANIZED HEALTH CARE EDUCATION/TRAINING PROGRAM

## 2024-06-27 PROCEDURE — 1159F MED LIST DOCD IN RCRD: CPT | Performed by: STUDENT IN AN ORGANIZED HEALTH CARE EDUCATION/TRAINING PROGRAM

## 2024-06-27 PROCEDURE — 36416 COLLJ CAPILLARY BLOOD SPEC: CPT | Performed by: STUDENT IN AN ORGANIZED HEALTH CARE EDUCATION/TRAINING PROGRAM

## 2024-06-27 PROCEDURE — 1126F AMNT PAIN NOTED NONE PRSNT: CPT | Performed by: STUDENT IN AN ORGANIZED HEALTH CARE EDUCATION/TRAINING PROGRAM

## 2024-06-27 PROCEDURE — 82947 ASSAY GLUCOSE BLOOD QUANT: CPT | Performed by: STUDENT IN AN ORGANIZED HEALTH CARE EDUCATION/TRAINING PROGRAM

## 2024-06-27 PROCEDURE — 80061 LIPID PANEL: CPT | Performed by: STUDENT IN AN ORGANIZED HEALTH CARE EDUCATION/TRAINING PROGRAM

## 2024-06-27 PROCEDURE — 84100 ASSAY OF PHOSPHORUS: CPT | Performed by: STUDENT IN AN ORGANIZED HEALTH CARE EDUCATION/TRAINING PROGRAM

## 2024-06-27 PROCEDURE — 36415 COLL VENOUS BLD VENIPUNCTURE: CPT | Performed by: STUDENT IN AN ORGANIZED HEALTH CARE EDUCATION/TRAINING PROGRAM

## 2024-06-27 PROCEDURE — 83036 HEMOGLOBIN GLYCOSYLATED A1C: CPT | Performed by: STUDENT IN AN ORGANIZED HEALTH CARE EDUCATION/TRAINING PROGRAM

## 2024-06-27 RX ORDER — ROSUVASTATIN CALCIUM 40 MG/1
40 TABLET, COATED ORAL DAILY
Qty: 90 TABLET | Refills: 3 | Status: SHIPPED | OUTPATIENT
Start: 2024-06-27

## 2024-06-27 RX ORDER — METFORMIN HYDROCHLORIDE 500 MG/1
1000 TABLET, EXTENDED RELEASE ORAL
Qty: 360 TABLET | Refills: 3 | Status: SHIPPED | OUTPATIENT
Start: 2024-06-27 | End: 2025-06-27

## 2024-06-27 ASSESSMENT — ENCOUNTER SYMPTOMS
OCCASIONAL FEELINGS OF UNSTEADINESS: 0
DEPRESSION: 0
LOSS OF SENSATION IN FEET: 0

## 2024-06-27 ASSESSMENT — PATIENT HEALTH QUESTIONNAIRE - PHQ9
SUM OF ALL RESPONSES TO PHQ9 QUESTIONS 1 AND 2: 0
1. LITTLE INTEREST OR PLEASURE IN DOING THINGS: NOT AT ALL
2. FEELING DOWN, DEPRESSED OR HOPELESS: NOT AT ALL

## 2024-06-27 ASSESSMENT — PAIN SCALES - GENERAL: PAINLEVEL: 0-NO PAIN

## 2024-06-27 NOTE — PATIENT INSTRUCTIONS
Continue Metformin 1000 mg in am and 500 mg with dinner   Continue Januvia 100 mg once daily  Continue Glimepiride 4 mg before breakfast and 4 mg before dinner  If any low BG less than 70 please let me know  -- Continue to check BG twice daily  -- Follow with ophthalmology and podiatry  -- Restart Rosuvastatin 40 mg once daily  -- Continue Amlodipine 10 mg once a day    Blood work today  RTC in 2 -3 months bring your meter with you

## 2024-06-27 NOTE — PROGRESS NOTES
Patient coming in for follow up for T2DM    Subjective   Lou Mata is a 78 y.o. female who presents for follow up for Type 2 diabetes mellitus.   Lab Results   Component Value Date    HGBA1C 9.4 (A) 08/21/2023      Mrs. Mata is a 78 year old F with hx of HTN, T2DM and HLD coming in for follow up.  date of diagnosis: years ago . Date of last HbA1c: August 2023 and results: 9.4%.   Januvia 100 mg daily was added in December  Was seen in December and MEtformin was increased to 1000 mg BId but patient is taking 1000-500  Ucx positive in April  In August patient called was having Diarrhea so we held Metfromin and she restarted 1000 mg in amd 500 in the evening  Current DM Regimen:.   Metformin 1000 mg with Breakfast and 500 mg with dinner  Januvia 100 mg OD  Glimepiride 4 mg before Breakfast and 4mg before dinner Sometimes forget the evening meds   Today morning after breakfast (30 min after) was 183   In am 130-150 before you eats.   In the afternoon: 110-120  After dinner goes up to 200s  No low BG. When she used to have low she would feel jitters  Breakfast: Oat meal, fried eggs, toast  Lunch: sandwich, leftovers  Dinner: Greens, sweet potatoes with meat loaf and baked chicken  No chips or papocorn  Snack: Turkey breast sandwich, sugarless cookies short bread cookies  Diet POP. Drinks no sugar added juice cranberry and pomegrante  Diabetes Surveillance:   Eye exam: Apt in March 2024 No retinopathy. Next month has cataract removal  Foot exam/podiatrist: foot clinic seen this year last in October   Cardiovascular: no coronary artery bypass graft and no coronary artery disease. LDL: 227 in August 2023 and rosuvastatin was   Renal: UACR positive in August 2023 and no end stage renal disease. Had angioedema  Neurologic: no neuropathy.     Occasional abdominal pain when she has to go to the bathroom  Constipation sometimes     Not taking her rosuvastatin 40 mg. Last LDL in August was 227    Review of Systems  all  pertinent systems reviewed and are otherwise negative   Objective   Visit Vitals  /83 (BP Location: Left arm, Patient Position: Sitting, BP Cuff Size: Adult)   Pulse 82   Temp 35.7 °C (96.3 °F) (Temporal)   Ht 1.524 m (5')   Wt 77.8 kg (171 lb 8 oz)   SpO2 100%   BMI 33.49 kg/m²   Smoking Status Former   BSA 1.81 m²      Physical Exam  Constitutional:       General: She is not in acute distress.     Appearance: Normal appearance.   Eyes:      Extraocular Movements: Extraocular movements intact.      Pupils: Pupils are equal, round, and reactive to light.   Cardiovascular:      Rate and Rhythm: Normal rate and regular rhythm.   Pulmonary:      Effort: Pulmonary effort is normal. No respiratory distress.      Breath sounds: Normal breath sounds.   Abdominal:      General: Bowel sounds are normal.      Palpations: Abdomen is soft.      Tenderness: There is no abdominal tenderness.   Skin:     Coloration: Skin is not jaundiced or pale.      Findings: No erythema or rash.   Neurological:      General: No focal deficit present.      Mental Status: She is alert and oriented to person, place, and time.      Deep Tendon Reflexes: Reflexes normal.   Psychiatric:         Mood and Affect: Mood normal.         Behavior: Behavior normal.       Lab Review  Glucose (mg/dL)   Date Value   08/21/2023 222 (H)   04/11/2023 159 (H)   12/15/2022 170 (H)     Hemoglobin A1C (%)   Date Value   08/21/2023 9.4 (A)   04/11/2023 8.5 (A)   12/15/2022 9.8 (A)     Bicarbonate (mmol/L)   Date Value   08/21/2023 26   04/11/2023 30   12/15/2022 25     Urea Nitrogen (mg/dL)   Date Value   08/21/2023 16   04/11/2023 14   12/15/2022 10     Creatinine (mg/dL)   Date Value   08/21/2023 0.88   04/11/2023 0.82   12/15/2022 0.76     Lab Results   Component Value Date    CHOL 322 (H) 08/21/2023    CHOL 285 (H) 04/11/2023    CHOL 259 (H) 12/15/2022     Lab Results   Component Value Date    HDL 67.9 08/21/2023    HDL 74.5 04/11/2023    HDL 66.9 12/15/2022  "    No results found for: \"LDLCALC\"  Lab Results   Component Value Date    TRIG 135 08/21/2023    TRIG 94 04/11/2023    TRIG 114 12/15/2022     No components found for: \"CHOLHDL\"   Lab Results   Component Value Date    TSH 2.01 04/26/2022     No results found for: \"ALBUR\", \"ESP51RBX\"     Health Maintenance:       Assessment/Plan   Mrs. Mata is a 78 year old F with hx of HTN, T2DM and HLD coming in for follow up.  date of diagnosis: years ago . Date of last HbA1c: August 2023 and results: 9.4%.   Januvia 100 mg daily was added in December  Was seen in December and MEtformin was increased to 1000 mg BId but patient is taking 1000-500  Ucx positive in April  In August patient called was having Diarrhea so we held Metfromin and she restarted 1000 mg in amd 500 in the evening  Current DM Regimen:.   Metformin 1000 mg with Breakfast and 500 mg with dinner  Januvia 100 mg OD  Glimepiride 4 mg before Breakfast and 4mg before dinner Sometimes forget the evening meds   Today morning after breakfast (30 min after) was 183   In am 130-150 before you eats.   In the afternoon: 110-120  After dinner goes up to 200s  No low BG. When she used to have low she would feel jitters  Diabetes Surveillance:   Eye exam: Apt in March 2024 No retinopathy. Next month has cataract removal  Foot exam/podiatrist: foot clinic seen this year last in October   Cardiovascular: no coronary artery bypass graft and no coronary artery disease. LDL: 227 in August 2023 and rosuvastatin was   Renal: UACR positive in August 2023 and no end stage renal disease. Had angioedema  Neurologic: no neuropathy.     Occasional abdominal pain when she has to go to the bathroom  Constipation sometimes     Not taking her rosuvastatin 40 mg. Last LDL in August was 227  Plan:  Continue Metformin 1000 mg in am and 500 mg with dinner   Continue Januvia 100 mg once daily  Continue Glimepiride 4 mg before breakfast and 4 mg before dinner  If any low BG less than 70 please let " me know  -- Continue to check BG twice daily  -- Follow with ophthalmology and podiatry  -- Restart Rosuvastatin 40 mg once daily  -- Continue Amlodipine 10 mg once a day    Blood work today  RTC in 2 -3 months bring your meter with you   Problem List Items Addressed This Visit       Diabetes mellitus type 2 without retinopathy (Multi) - Primary    Relevant Medications    metFORMIN XR (Glucophage-XR) 500 mg 24 hr tablet    Other Relevant Orders    Renal Function Panel (Completed)    Hemoglobin A1C (Completed)    Lipid Panel (Completed)    Hyperlipidemia    Relevant Medications    rosuvastatin (Crestor) 40 mg tablet    Other Relevant Orders    Lipid Panel (Completed)       At least 30 minutes of direct consultation was spent reviewing the patient's chart as well as discussing and  reviewing the plan including educating and answering questions and concerns, greater than 50 percent spent in counseling and coordination of care.         16

## 2024-07-09 ENCOUNTER — APPOINTMENT (OUTPATIENT)
Dept: BEHAVIORAL HEALTH | Facility: CLINIC | Age: 79
End: 2024-07-09
Payer: COMMERCIAL

## 2024-07-11 ENCOUNTER — APPOINTMENT (OUTPATIENT)
Dept: ENDOCRINOLOGY | Facility: HOSPITAL | Age: 79
End: 2024-07-11
Payer: COMMERCIAL

## 2024-07-12 ENCOUNTER — TELEPHONE (OUTPATIENT)
Dept: ENDOCRINOLOGY | Facility: HOSPITAL | Age: 79
End: 2024-07-12
Payer: COMMERCIAL

## 2024-07-12 NOTE — TELEPHONE ENCOUNTER
Left voicemail to give attached message from provider. Office number was given for a call back in case of any questions or concerns.    Eileen Daniel RN   ----- Message from Ascencion Arroyo sent at 7/11/2024 11:49 PM EDT -----  Please inform Mrs. Mata that her A1c improved to 7.9% continue same diabetes meds.  LDL remains elevated > 200 she needs to take her rosuvastatin

## 2024-08-21 ENCOUNTER — APPOINTMENT (OUTPATIENT)
Dept: BEHAVIORAL HEALTH | Facility: CLINIC | Age: 79
End: 2024-08-21
Payer: COMMERCIAL

## 2024-08-26 ENCOUNTER — APPOINTMENT (OUTPATIENT)
Dept: OPHTHALMOLOGY | Facility: CLINIC | Age: 79
End: 2024-08-26
Payer: COMMERCIAL

## 2024-08-27 ENCOUNTER — TELEPHONE (OUTPATIENT)
Dept: OTHER | Age: 79
End: 2024-08-27

## 2024-08-27 ENCOUNTER — APPOINTMENT (OUTPATIENT)
Dept: BEHAVIORAL HEALTH | Facility: CLINIC | Age: 79
End: 2024-08-27
Payer: COMMERCIAL

## 2024-08-27 ENCOUNTER — APPOINTMENT (OUTPATIENT)
Dept: PRIMARY CARE | Facility: HOSPITAL | Age: 79
End: 2024-08-27
Payer: COMMERCIAL

## 2024-08-27 NOTE — TELEPHONE ENCOUNTER
Pt called to reschedule today's [facility].    She is requesting a call back to explain why she wasn't able to make today's appointment    [phone]

## 2024-09-10 ENCOUNTER — APPOINTMENT (OUTPATIENT)
Dept: OPHTHALMOLOGY | Facility: CLINIC | Age: 79
End: 2024-09-10
Payer: COMMERCIAL

## 2024-09-10 ENCOUNTER — OFFICE VISIT (OUTPATIENT)
Dept: BEHAVIORAL HEALTH | Facility: CLINIC | Age: 79
End: 2024-09-10
Payer: COMMERCIAL

## 2024-09-10 VITALS
RESPIRATION RATE: 18 BRPM | SYSTOLIC BLOOD PRESSURE: 128 MMHG | HEART RATE: 94 BPM | BODY MASS INDEX: 33.32 KG/M2 | WEIGHT: 170.6 LBS | DIASTOLIC BLOOD PRESSURE: 77 MMHG | TEMPERATURE: 97.9 F

## 2024-09-10 DIAGNOSIS — F20.3 UNDIFFERENTIATED SCHIZOPHRENIA (MULTI): ICD-10-CM

## 2024-09-10 PROCEDURE — 99215 OFFICE O/P EST HI 40 MIN: CPT

## 2024-09-10 PROCEDURE — 3074F SYST BP LT 130 MM HG: CPT

## 2024-09-10 PROCEDURE — 1160F RVW MEDS BY RX/DR IN RCRD: CPT

## 2024-09-10 PROCEDURE — 3078F DIAST BP <80 MM HG: CPT

## 2024-09-10 PROCEDURE — 1159F MED LIST DOCD IN RCRD: CPT

## 2024-09-10 PROCEDURE — 1126F AMNT PAIN NOTED NONE PRSNT: CPT

## 2024-09-10 RX ORDER — RISPERIDONE 0.5 MG/1
TABLET ORAL
Qty: 120 TABLET | Refills: 0 | Status: SHIPPED | OUTPATIENT
Start: 2024-09-10

## 2024-09-10 RX ORDER — ARIPIPRAZOLE 15 MG/1
15 TABLET ORAL DAILY
Qty: 30 TABLET | Refills: 0 | Status: SHIPPED | OUTPATIENT
Start: 2024-09-10

## 2024-09-10 ASSESSMENT — COLUMBIA-SUICIDE SEVERITY RATING SCALE - C-SSRS
ATTEMPT LIFETIME: NO
SUICIDE, SINCE LAST CONTACT: NO
ATTEMPT SINCE LAST CONTACT: NO
2. HAVE YOU ACTUALLY HAD ANY THOUGHTS OF KILLING YOURSELF?: NO
1. SINCE LAST CONTACT, HAVE YOU WISHED YOU WERE DEAD OR WISHED YOU COULD GO TO SLEEP AND NOT WAKE UP?: NO
TOTAL  NUMBER OF ABORTED OR SELF INTERRUPTED ATTEMPTS LIFETIME: NO
1. HAVE YOU WISHED YOU WERE DEAD OR WISHED YOU COULD GO TO SLEEP AND NOT WAKE UP?: NO
TOTAL  NUMBER OF ABORTED OR SELF INTERRUPTED ATTEMPTS SINCE LAST CONTACT: NO
6. HAVE YOU EVER DONE ANYTHING, STARTED TO DO ANYTHING, OR PREPARED TO DO ANYTHING TO END YOUR LIFE?: NO
TOTAL  NUMBER OF INTERRUPTED ATTEMPTS LIFETIME: NO
2. HAVE YOU ACTUALLY HAD ANY THOUGHTS OF KILLING YOURSELF?: NO
TOTAL  NUMBER OF INTERRUPTED ATTEMPTS SINCE LAST CONTACT: NO
6. HAVE YOU EVER DONE ANYTHING, STARTED TO DO ANYTHING, OR PREPARED TO DO ANYTHING TO END YOUR LIFE?: NO

## 2024-09-10 ASSESSMENT — PAIN SCALES - GENERAL: PAINLEVEL: 0-NO PAIN

## 2024-09-10 NOTE — PATIENT INSTRUCTIONS
Plan/Recommendations:  Medications:    -Start risperidone 0.5 mg one tablet twice daily for one week, then increase to two tablets daily   -Decrease Abilify to 15 mg daily for hallucinations  Follow up: October 8th 3:00  Call  Psychiatry at [phone] with issues.  For [facility] residents, [employer] is a 24/7 hotline you can call for assistance [[phone]]. Please call [phone] or go to your closest Emergency Room if you feel unsafe. This includes thoughts of hurting yourself or anyone else, or having other troubles such as hearing voices, seeing visions, or having new and scary thoughts about the people around you.

## 2024-09-10 NOTE — PROGRESS NOTES
"Outpatient Psychiatry- Follow up visit    Subjective   Lou Mata, a 78 y.o. female, presenting for follow up visit for   Chief Complaint   Patient presents with    Schizophrenia        HPI:  Lou states she has been \"okay\", not very good. Pt reports her biggest stressor is \"the voices\". They will come and go, irritating to her at times. She is hearing voices daily, she feels that she hears them when she gets upset. The voices start during the day and get less and less throughout the day. Pt describes them as chatter, not able to distinguish what they are saying. The voices are low. Lou will turn her radio on to a spiritual station to alleviate the voices.      Pt feels she can tolerate the voices she is hearing majority of the time. She expects to hear voices with her diagnosis. She has been forgetting to take her night time medication, this occurs a few times a week every week.     Pt has been getting spam calls on her phone, causing her to become upset. She is answering the calls with a greeting, they will not respond and will hang up. Pt will intermittently not answer the calls to avoid getting upset.     Pt reports tremors in her legs, standing and sitting. Pt denies it causing issues, does not effect sleep. Pt states she has some nerves and does not typically shake or tremor as much at home.    Pt reports mood over all, \"good\".  Pt denies any issues with sleeping, she goes to bed around 9/10 pm and wakes up around 5 am. Denies waking up throughout the night.     Pt states her appetite is good.     Per previous HPI:  Lou states her mood is good, sometimes gets angry over the hallucinations. Wonders why they happen. Auditory hallucinations, hears voices like they are coming through the walls. Will occur in the evening mostly, sometimes in the morning. Will go away overnight.   Lou will listen to a spiritual station which will help them fade. Hallucinations are directed towards her, talking about her but no " command hallucinations.   Otherwise mood is good, sometimes feels depressed, tries not to let it overtake her. Denies lingering depressive sx. Denies SI/HI.     Stays active, decorates her house. Denies problems with anxiety and ruminating thoughts.    Goes to bed at 11 until three, cannot fall back asleep.   Will toss and turn the rest of the night. May doze in front of the TV during the day for approx 30 min.    Has some EPS, with mouth movements, does not feel it is bothersome.     Psychiatric Review Of Systems:  Depressive Symptoms: negative  Manic Symptoms: negative  Anxiety Symptoms: Negative  Psychotic Symptoms: auditory hallucinations  Trauma Symptoms: None  Other Symptoms/Concerns:Other: (comments) none reported  Delirium/Altered Mental Status Symptoms:Other: (comment) WNL    Current Medications:    Current Outpatient Medications:     OneTouch Ultra Test strip, USE AS DIRECTED 3 TIMES A DAY, Disp: 300 strip, Rfl: 2    albuterol 90 mcg/actuation inhaler, Inhale 2 puffs every 4 hours if needed for shortness of breath., Disp: , Rfl:     amLODIPine (Norvasc) 10 mg tablet, TAKE 1 TABLET BY MOUTH EVERY DAY, Disp: 90 tablet, Rfl: 3    ARIPiprazole (Abilify) 15 mg tablet, Take 1 tablet (15 mg) by mouth once daily., Disp: 30 tablet, Rfl: 0    aspirin 81 mg EC tablet, Take 1 tablet (81 mg) by mouth once daily., Disp: , Rfl:     benzoyl peroxide 5 % lotion, 1 Application., Disp: , Rfl:     doxycycline (Vibra-Tabs) 100 mg tablet, 1 tablet (100 mg)., Disp: , Rfl:     glimepiride (Amaryl) 4 mg tablet, Take 1 tablet (4 mg) by mouth 2 times a day., Disp: , Rfl:     glipiZIDE (Glucotrol) 5 mg tablet, Take 2 tablets (10 mg) by mouth twice a day., Disp: , Rfl:     hydroCHLOROthiazide (HYDRODiuril) 25 mg tablet, Take 1 tablet (25 mg) by mouth once daily., Disp: , Rfl:     lancets (OneTouch UltraSoft Lancets) misc, 3 times a day. Use to test blood sugar, Disp: , Rfl:     Lumigan 0.01 % ophthalmic solution, ADMINISTER 1 DROP  INTO BOTH EYES ONCE DAILY AT BEDTIME., Disp: , Rfl:     metFORMIN XR (Glucophage-XR) 500 mg 24 hr tablet, Take 2 tablets (1,000 mg) by mouth 2 times daily (morning and late afternoon). Do not crush, chew, or split., Disp: 360 tablet, Rfl: 3    nitrofurantoin (Macrodantin) 100 mg capsule, Take 1 capsule (100 mg) by mouth every 12 hours., Disp: , Rfl:     ondansetron ODT (Zofran-ODT) 4 mg disintegrating tablet, Take 1-2 tablets (4-8 mg) by mouth every 6 hours if needed for vomiting or nausea., Disp: , Rfl:     risperiDONE (RisperDAL) 0.5 mg tablet, Take one tab twice daily for one week then increase to two tabs twice daily, Disp: 120 tablet, Rfl: 0    rosuvastatin (Crestor) 10 mg tablet, Take 1 tablet (10 mg) by mouth once daily at bedtime., Disp: , Rfl:     rosuvastatin (Crestor) 40 mg tablet, Take 1 tablet (40 mg) by mouth once daily., Disp: 90 tablet, Rfl: 3    SITagliptin phosphate (Januvia) 100 mg tablet, Take 1 tablet (100 mg) by mouth once daily., Disp: , Rfl:     Medical History:  Past Medical History:   Diagnosis Date    Age-related nuclear cataract, unspecified eye 11/30/2016    Nuclear sclerosis    Cellulitis of head (any part, except face) 11/26/2014    Abscess or cellulitis of scalp    Disorder of the skin and subcutaneous tissue, unspecified 04/19/2016    Skin lesion    Essential (primary) hypertension 12/15/2022    Hypertension    Myopia, bilateral 01/09/2015    Bilateral myopia    Other specified personal risk factors, not elsewhere classified     At risk of UTI    Pelvic and perineal pain     Pelvic pain    Personal history of diseases of the skin and subcutaneous tissue 04/19/2016    History of furuncle    Personal history of other diseases of the nervous system and sense organs 11/30/2016    History of keratitis    Personal history of other mental and behavioral disorders 12/29/2020    History of anxiety    Presbyopia 01/09/2015    Presbyopia OU    Primary open-angle glaucoma, unspecified eye, stage  "unspecified 05/18/2016    Glaucoma primary, open angle    Type 2 diabetes mellitus without complications (Multi) 12/15/2022    Diabetes mellitus    Unspecified abdominal pain 05/02/2017    Abdominal cramps       Past Psychiatric History:   Onset History: Schizophrenia approx 1986.   Inpatient history: 3-4 times for schizophrenia, last time approx 10 yrs ago.   Suicide attempts/Self-Harm/Ideation History: None   Current providers: None   Past providers: Dr Cha, others she cannot remember.   Past medication trials: Haldol, Caplyta (did not like), Abilify, Zyprexa, more she cannot remember.     Family psychiatric history:  Mother    · Family history of Alcohol abuse  Social History:   Upbringing: Born and raised in [place]. Moved to [place] when 13 yo. One older sister and one younger brother. Parents , childhood was \"up and down, parents fought\" got  when pt 4 yo.  Trauma: Denies hx of trauma  Education: Finished 11th grade, denies learning disabilities or developmental delays  Work: Worked various jobs; worked in a bank, laundry facility, office work  Marital Status:  but   Children: Three children, two female, one male  Living situation: Lives alone, rent apartment  : Denies  Legal: Denies  Access to Weapons: No firearms in household  Guardian/POA/Payee:  self    Substance Use History:  Tobacco use: former smoker; Started smoking in her 40's, quit 3 yrs ago. Highest use 1.5 PPD   Use of alcohol: denied; Used to drink heavily, slowed down in her 40's, stopped drinking 2-3 yrs ago.   Use of caffeine:   Use of other substances: denies; used to use illicit drugs, quit 15 yrs ago  Legal consequences of substance use: denies  Substance use disorder treatment: n/a    Record Review: brief     Medical Review Of Systems:  A comprehensive review of systems was negative.    MEDICAL HISTORY  -PCP: Ascencion Arroyo MD  -TBI/head trauma/LOC/seizure hx: denies      Objective   Mental Status " "Exam  Appearance: Lou is dressed appropriately and is neat and clean in appearance.   Attitude: Calm, cooperative, and engaged in conversation.  Behavior: Appropriate eye contact.   Motor Activity: No psychomotor agitation or retardation. Lou has a significant tremor in both legs. Will have the tremor with standing as well but not as severe.The tremor gets worse with anxiety, not as bad at home.  Speech: Regular rate, rhythm, volume. Spontaneous, no pressured speech.  Mood: \"OK\"  Affect: Slightly restricted, mood congruent.  Thought Process: Linear, logical, and goal-directed. No loose associations or gross thought disorganization.  Thought Content: Denied current suicidal ideation or thoughts of harm to self, denied homicidal ideation or thoughts of harm to others. No delusional thinking elicited. No perseverations or obsessions identified.   Perception: Lou has auditory hallucinations, denies visual hallucinations. did not appear to be responding to hallucinatory stimuli.   Cognition: Alert, oriented x3. Preserved attention span and concentration, recent and remote memory. Adequate fund of knowledge. No deficits in language.   Insight: Good, in regards to understanding mental health condition  Judgement: Intact    Vitals:  Vitals:    09/10/24 1458   BP: 128/77   Pulse: 94   Resp: 18   Temp: 36.6 °C (97.9 °F)         Risk Assessment:  SI/HI ASSESSMENT  -Risk Assessment: Lou Mata is currently a low acute risk of suicide and self-harm due to no past suicide attempt(s) and not currently endorsing thoughts of suicide. Lou Mata is currently a low acute risk of violence and harm to others due to no past history of violence and not currently threatening others.  -Suicidal Risk Factors: unmarried/single, age <19 years and >65 years, living alone/lack of social support, and current psychiatric illness  -Violence Risk Factors: none  -Protective Factors: social support/connectedness, positive family relationships, " hopefulness/future orientation, and no SI, no hx of SA, treatment compliant, willing to seek help.  -Plan to Reduce Risk: Establish medication regimen and outpatient follow-up care    Lou was seen today for schizophrenia.  Diagnoses and all orders for this visit:  Undifferentiated schizophrenia (Multi)  -     risperiDONE (RisperDAL) 0.5 mg tablet; Take one tab twice daily for one week then increase to two tabs twice daily  -     ARIPiprazole (Abilify) 15 mg tablet; Take 1 tablet (15 mg) by mouth once daily.         Impression:  Lou is doing well overall; she continues to have auditory hallucinations intermittently. He is able to manage them and are not distressing currently. She continues to have a tremor in her legs bilaterally that worsens with nerves.No abnormal mouth movements noted. Discussed again the option of changing medication; reviewed the option of risperidone and discussed potential SE including sz, wt gain, elevated prolactin, metabolic changes, EPS, NMS. Lou agreeable to a trial. She does not remember if she has been on risperidone in the past or not. Will start 0.5 mg twice daily for a week then increase to 1 mg BID. Will also decrease Abilify to 15 mg daily with the plan to discontinue once stable on risperidone.     Plan/Recommendations:  Medications:    -Start risperidone 0.5 mg one tablet twice daily for one week, then increase to two tablets daily   -Decrease Abilify to 15 mg daily for hallucinations  Follow up: October 8th 3:00  Call  Psychiatry at [phone] with issues.  For [facility] residents, [employer] is a 24/7 hotline you can call for assistance [[phone]]. Please call [phone] or go to your closest Emergency Room if you feel unsafe. This includes thoughts of hurting yourself or anyone else, or having other troubles such as hearing voices, seeing visions, or having new and scary thoughts about the people around you.    Review with patient: Treatment plan reviewed  with the patient.  Medication risks/benefit reviewed with the patient    Time Spent:  Prep time: 1 min  Direct patient time: 30 min  Documentation time: 10 min  Total time: 41 min    VIANEY Bansal-CNP

## 2024-09-15 ASSESSMENT — ABNORMAL INVOLUNTARY MOVEMENT SCALE (AIMS)
PATIENT_WEARS_DENTURES: NO
LOWER_BODY_EXTREMITIES: MODERATE
TONGUE: NONE, NORMAL
AIMS_PATIENT_AWARENESS: AWARE, NO DISTRESS
LIPS_PARIETAL: NONE, NORMAL
AIMS_SEVERITY: 4
UPPER_BODY_EXTREMITIES: MINIMAL
AIMS_PATIENT_INCAPACITATION: MILD
NECK_SHOULDER_HIPS: NONE, NORMAL
CURRENT_PROBLEMS_TEETH_DENTURES: NO
FACIAL_EXPRESSION_MUSCLES: NONE, NORMAL
JAW: NONE, NORMAL

## 2024-09-16 ENCOUNTER — OFFICE VISIT (OUTPATIENT)
Dept: UROLOGY | Facility: CLINIC | Age: 79
End: 2024-09-16
Payer: COMMERCIAL

## 2024-09-16 DIAGNOSIS — R39.9 UTI SYMPTOMS: ICD-10-CM

## 2024-09-16 DIAGNOSIS — N39.0 RECURRENT UTI: ICD-10-CM

## 2024-09-16 DIAGNOSIS — R35.0 FREQUENT URINATION: Primary | ICD-10-CM

## 2024-09-16 LAB
APPEARANCE UR: ABNORMAL
BACTERIA #/AREA URNS AUTO: ABNORMAL /HPF
BILIRUB UR STRIP.AUTO-MCNC: NEGATIVE MG/DL
COLOR UR: COLORLESS
GLUCOSE UR STRIP.AUTO-MCNC: NORMAL MG/DL
HYALINE CASTS #/AREA URNS AUTO: ABNORMAL /LPF
KETONES UR STRIP.AUTO-MCNC: NEGATIVE MG/DL
LEUKOCYTE ESTERASE UR QL STRIP.AUTO: ABNORMAL
MUCOUS THREADS #/AREA URNS AUTO: ABNORMAL /LPF
NITRITE UR QL STRIP.AUTO: NEGATIVE
PH UR STRIP.AUTO: 5.5 [PH]
POC APPEARANCE, URINE: CLEAR
POC BILIRUBIN, URINE: NEGATIVE
POC BLOOD, URINE: ABNORMAL
POC COLOR, URINE: YELLOW
POC GLUCOSE, URINE: NEGATIVE MG/DL
POC KETONES, URINE: NEGATIVE MG/DL
POC LEUKOCYTES, URINE: ABNORMAL
POC NITRITE,URINE: NEGATIVE
POC PH, URINE: 6 PH
POC PROTEIN, URINE: NEGATIVE MG/DL
POC SPECIFIC GRAVITY, URINE: 1.01
POC UROBILINOGEN, URINE: 0.2 EU/DL
PROT UR STRIP.AUTO-MCNC: ABNORMAL MG/DL
RBC # UR STRIP.AUTO: NEGATIVE /UL
RBC #/AREA URNS AUTO: ABNORMAL /HPF
SP GR UR STRIP.AUTO: 1.01
SQUAMOUS #/AREA URNS AUTO: ABNORMAL /HPF
UROBILINOGEN UR STRIP.AUTO-MCNC: NORMAL MG/DL
WBC #/AREA URNS AUTO: >50 /HPF
WBC CLUMPS #/AREA URNS AUTO: ABNORMAL /HPF

## 2024-09-16 PROCEDURE — 81001 URINALYSIS AUTO W/SCOPE: CPT

## 2024-09-16 PROCEDURE — 99213 OFFICE O/P EST LOW 20 MIN: CPT | Performed by: NURSE PRACTITIONER

## 2024-09-16 PROCEDURE — 1159F MED LIST DOCD IN RCRD: CPT | Performed by: NURSE PRACTITIONER

## 2024-09-16 PROCEDURE — G2211 COMPLEX E/M VISIT ADD ON: HCPCS | Performed by: NURSE PRACTITIONER

## 2024-09-16 PROCEDURE — 51798 US URINE CAPACITY MEASURE: CPT | Performed by: NURSE PRACTITIONER

## 2024-09-16 PROCEDURE — 87086 URINE CULTURE/COLONY COUNT: CPT

## 2024-09-16 PROCEDURE — 1160F RVW MEDS BY RX/DR IN RCRD: CPT | Performed by: NURSE PRACTITIONER

## 2024-09-16 PROCEDURE — 81003 URINALYSIS AUTO W/O SCOPE: CPT | Performed by: NURSE PRACTITIONER

## 2024-09-16 PROCEDURE — 1036F TOBACCO NON-USER: CPT | Performed by: NURSE PRACTITIONER

## 2024-09-16 RX ORDER — ESTRADIOL 0.1 MG/G
CREAM VAGINAL
Qty: 42.5 G | Refills: 3 | Status: SHIPPED | OUTPATIENT
Start: 2024-09-16

## 2024-09-16 NOTE — PROGRESS NOTES
[facility]  Outpatient Clinic Note    Subjective   Lou Mata is a 78 y.o. female    History of Present Illness   Patient presenting to clinic today for annual FUV. History of gross hematuria and recurrent UTI.   Today's visit patient reports dysuria on occasion and urinary frequency, not bothersome.   She denies urgency, fevers, chills, n/v, or flank pain. She is drinking mostly Pepsi throughout the   Day. Occasional BRODY, minimal leakage. No gross hematuria. Complaint of vaginal dryness. No recent UTI   Urinalysis today Trace Blood, Small leukocytes.   PVR 0 ml     Lab Results   Component Value Date    URINECULTURE NO SIGNIFICANT GROWTH. 09/15/2023    URINECULTURE Escherichia coli (A) 05/17/2023    URINECULTURE Escherichia coli (A) 04/11/2023       Past Medical History and Surgical History   Past Medical History:   Diagnosis Date    Age-related nuclear cataract, unspecified eye 11/30/2016    Nuclear sclerosis    Cellulitis of head (any part, except face) 11/26/2014    Abscess or cellulitis of scalp    Disorder of the skin and subcutaneous tissue, unspecified 04/19/2016    Skin lesion    Essential (primary) hypertension 12/15/2022    Hypertension    Myopia, bilateral 01/09/2015    Bilateral myopia    Other specified personal risk factors, not elsewhere classified     At risk of UTI    Pelvic and perineal pain     Pelvic pain    Personal history of diseases of the skin and subcutaneous tissue 04/19/2016    History of furuncle    Personal history of other diseases of the nervous system and sense organs 11/30/2016    History of keratitis    Personal history of other mental and behavioral disorders 12/29/2020    History of anxiety    Presbyopia 01/09/2015    Presbyopia OU    Primary open-angle glaucoma, unspecified eye, stage unspecified 05/18/2016    Glaucoma primary, open angle    Type 2 diabetes mellitus without complications (Multi) 12/15/2022    Diabetes mellitus    Unspecified abdominal pain 05/02/2017     Abdominal cramps     No past surgical history on file.    Medications  Current Outpatient Medications on File Prior to Visit   Medication Sig Dispense Refill    OneTouch Ultra Test strip USE AS DIRECTED 3 TIMES A  strip 2    albuterol 90 mcg/actuation inhaler Inhale 2 puffs every 4 hours if needed for shortness of breath.      amLODIPine (Norvasc) 10 mg tablet TAKE 1 TABLET BY MOUTH EVERY DAY 90 tablet 3    ARIPiprazole (Abilify) 15 mg tablet Take 1 tablet (15 mg) by mouth once daily. 30 tablet 0    aspirin 81 mg EC tablet Take 1 tablet (81 mg) by mouth once daily.      benzoyl peroxide 5 % lotion 1 Application.      doxycycline (Vibra-Tabs) 100 mg tablet 1 tablet (100 mg).      glimepiride (Amaryl) 4 mg tablet Take 1 tablet (4 mg) by mouth 2 times a day.      glipiZIDE (Glucotrol) 5 mg tablet Take 2 tablets (10 mg) by mouth twice a day.      hydroCHLOROthiazide (HYDRODiuril) 25 mg tablet Take 1 tablet (25 mg) by mouth once daily.      lancets (OneTouch UltraSoft Lancets) misc 3 times a day. Use to test blood sugar      Lumigan 0.01 % ophthalmic solution ADMINISTER 1 DROP INTO BOTH EYES ONCE DAILY AT BEDTIME.      metFORMIN XR (Glucophage-XR) 500 mg 24 hr tablet Take 2 tablets (1,000 mg) by mouth 2 times daily (morning and late afternoon). Do not crush, chew, or split. 360 tablet 3    nitrofurantoin (Macrodantin) 100 mg capsule Take 1 capsule (100 mg) by mouth every 12 hours.      ondansetron ODT (Zofran-ODT) 4 mg disintegrating tablet Take 1-2 tablets (4-8 mg) by mouth every 6 hours if needed for vomiting or nausea.      risperiDONE (RisperDAL) 0.5 mg tablet Take one tab twice daily for one week then increase to two tabs twice daily 120 tablet 0    rosuvastatin (Crestor) 10 mg tablet Take 1 tablet (10 mg) by mouth once daily at bedtime.      rosuvastatin (Crestor) 40 mg tablet Take 1 tablet (40 mg) by mouth once daily. 90 tablet 3    SITagliptin phosphate (Januvia) 100 mg tablet Take 1 tablet (100 mg) by  mouth once daily.      [DISCONTINUED] ARIPiprazole (Abilify) 30 mg tablet Take 1 tablet (30 mg) by mouth once daily. 90 tablet 1     No current facility-administered medications on file prior to visit.       Objective   Physicial Exam  General: Well developed, well nourished, alert and cooperative, appears in no acute distress  Eyes: Non-injected conjunctiva, sclera clear, no proptosis  Cardiac: Extremities are warm and well perfused. No edema, cyanosis or pallor.   Lungs: Breathing is easy, non-labored. Speaking in clear and complete sentences. Normal diaphragmatic movement.  MSK: Ambulatory with steady gait, unassisted  Neuro: alert and oriented to person, place and time  Psych: Demonstrates good judgement and reason, without hallucinations, abnormal affect or abnormal behaviors.  Skin: no obvious lesions, no rashes.    Appointment on 09/16/2024   Component Date Value Ref Range Status    POC Color, Urine 09/16/2024 Yellow  Straw, Yellow, Light-Yellow Final    POC Appearance, Urine 09/16/2024 Clear  Clear Final    POC Glucose, Urine 09/16/2024 NEGATIVE  NEGATIVE mg/dl Final    POC Bilirubin, Urine 09/16/2024 NEGATIVE  NEGATIVE Final    POC Ketones, Urine 09/16/2024 NEGATIVE  NEGATIVE mg/dl Final    POC Specific Gravity, Urine 09/16/2024 1.015  1.005 - 1.035 Final    POC Blood, Urine 09/16/2024 TRACE-Lysed (A)  NEGATIVE Final    POC PH, Urine 09/16/2024 6.0  No Reference Range Established PH Final    POC Protein, Urine 09/16/2024 NEGATIVE  NEGATIVE, 30 (1+) mg/dl Final    POC Urobilinogen, Urine 09/16/2024 0.2  0.2, 1.0 EU/DL Final    Poc Nitrite, Urine 09/16/2024 NEGATIVE  NEGATIVE Final    POC Leukocytes, Urine 09/16/2024 SMALL (1+) (A)  NEGATIVE Final      Review of Systems  All other systems have been reviewed and are negative for complaint.      Assessment and Plan     I discussed incontinence is secondary to weak pelvic floor muscles. I advised initiating Kegel exercises since leakage is minimal     I  discussed fluid management and timed voiding. I discussed oral treatment with anticholinergics. She declines Continue with behavioral modifications.     Today we discussed UTI in great detail.   We will send urine today for analysis and culture. Will treat as clinically indicated.     We discussed UTI prevention in great detail. It is recommended to increase daily water intake to at least 80 ounces per day. It is important to have smooth, daily bowel movements and good bowel health. If you are not, you may take Miralax 17g daily to help with bowel movements. Do not take this if you are having watery or loose stools. You may consider taking a cranberry supplement or D-Mannose daily to prevent bacteria from adhering to your bladder wall. I recommend taking a daily probiotic for good vaginal lupe health.     Will try Estrace cream, explained. She denies personal history of breast or gynecological cancer. I explained that small amounts of estrogen can be detected in the blood with use of Estrace cream and that the patient should follow up regularly for breast and gynecological cancer screening.     RTC in 6 months, sooner if needed.     All questions and concerns were addressed. Patient verbalizes understanding and has no other questions at this time.     Nait Rankin-- ALESIA WINTERS  Office Phone:  [phone]

## 2024-09-18 LAB — BACTERIA UR CULT: NORMAL

## 2024-09-26 ENCOUNTER — APPOINTMENT (OUTPATIENT)
Dept: OBSTETRICS AND GYNECOLOGY | Facility: CLINIC | Age: 79
End: 2024-09-26
Payer: COMMERCIAL

## 2024-09-26 VITALS
SYSTOLIC BLOOD PRESSURE: 128 MMHG | DIASTOLIC BLOOD PRESSURE: 80 MMHG | BODY MASS INDEX: 32.79 KG/M2 | HEIGHT: 60 IN | WEIGHT: 167 LBS

## 2024-09-26 DIAGNOSIS — Z12.31 ENCOUNTER FOR SCREENING MAMMOGRAM FOR BREAST CANCER: ICD-10-CM

## 2024-09-26 DIAGNOSIS — N90.89 VULVAL LESION: ICD-10-CM

## 2024-09-26 PROCEDURE — 1159F MED LIST DOCD IN RCRD: CPT | Performed by: OBSTETRICS & GYNECOLOGY

## 2024-09-26 PROCEDURE — 1126F AMNT PAIN NOTED NONE PRSNT: CPT | Performed by: OBSTETRICS & GYNECOLOGY

## 2024-09-26 PROCEDURE — 3074F SYST BP LT 130 MM HG: CPT | Performed by: OBSTETRICS & GYNECOLOGY

## 2024-09-26 PROCEDURE — 99204 OFFICE O/P NEW MOD 45 MIN: CPT | Performed by: OBSTETRICS & GYNECOLOGY

## 2024-09-26 PROCEDURE — 3079F DIAST BP 80-89 MM HG: CPT | Performed by: OBSTETRICS & GYNECOLOGY

## 2024-09-26 PROCEDURE — 1036F TOBACCO NON-USER: CPT | Performed by: OBSTETRICS & GYNECOLOGY

## 2024-09-26 ASSESSMENT — PAIN SCALES - GENERAL: PAINLEVEL: 0-NO PAIN

## 2024-09-26 NOTE — PROGRESS NOTES
Pt here c/o bumps on mons for years  Itches  No drainage  Always there    Saw dermatologist who prescribed a wash and cream  Never told what they were    Needs mammogram - last mammogram 2014 normal  No h/o abnormal mammogram or pap  S/p hysterectomy    S/p cologuard normal    PE  Breast no mass, no axillary LAD  Mons several inclusion cysts    A/P: normal breast exam. Symptomatic mons inclusion cysts  Pt would like to schedule I&D  Mammogram

## 2024-09-27 ENCOUNTER — APPOINTMENT (OUTPATIENT)
Dept: BEHAVIORAL HEALTH | Facility: CLINIC | Age: 79
End: 2024-09-27
Payer: COMMERCIAL

## 2024-10-08 ENCOUNTER — APPOINTMENT (OUTPATIENT)
Dept: BEHAVIORAL HEALTH | Facility: CLINIC | Age: 79
End: 2024-10-08
Payer: COMMERCIAL

## 2024-10-15 ENCOUNTER — HOSPITAL ENCOUNTER (OUTPATIENT)
Dept: RADIOLOGY | Facility: CLINIC | Age: 79
Discharge: HOME | End: 2024-10-15
Payer: COMMERCIAL

## 2024-10-15 DIAGNOSIS — Z12.31 ENCOUNTER FOR SCREENING MAMMOGRAM FOR BREAST CANCER: ICD-10-CM

## 2024-10-15 PROCEDURE — 77063 BREAST TOMOSYNTHESIS BI: CPT

## 2024-10-15 PROCEDURE — 77067 SCR MAMMO BI INCL CAD: CPT | Performed by: RADIOLOGY

## 2024-10-15 PROCEDURE — 77063 BREAST TOMOSYNTHESIS BI: CPT | Performed by: RADIOLOGY

## 2024-10-24 NOTE — PROGRESS NOTES
Chief complaint:    HPI:  Lou Mata is a 79 y.o. female with pmh of DM, HT. DL, schizophrenia,   presenting with ***    Medications:  Current Outpatient Medications   Medication Instructions    albuterol 90 mcg/actuation inhaler 2 puffs, inhalation, Every 4 hours PRN    amLODIPine (NORVASC) 10 mg, oral, Daily    ARIPiprazole (ABILIFY) 15 mg, oral, Daily    aspirin 81 mg EC tablet 1 tablet, oral, Daily    benzoyl peroxide 5 % lotion 1 Application    doxycycline (Vibra-Tabs) 100 mg tablet 1 tablet    estradiol (Estrace) 0.01 % (0.1 mg/gram) vaginal cream Apply pea size amount (1 g) nightly for 2 weeks, then 2 times per week.    glimepiride (Amaryl) 4 mg tablet 1 tablet, oral, 2 times daily    glipiZIDE (Glucotrol) 5 mg tablet 2 tablets, oral, 2 times daily    hydroCHLOROthiazide (HYDRODiuril) 25 mg tablet 1 tablet, oral, Daily    lancets (OneTouch UltraSoft Lancets) misc miscellaneous, 3 times daily, Use to test blood sugar    Lumigan 0.01 % ophthalmic solution ADMINISTER 1 DROP INTO BOTH EYES ONCE DAILY AT BEDTIME.    metFORMIN XR (GLUCOPHAGE-XR) 1,000 mg, oral, 2 times daily (morning and late afternoon), Do not crush, chew, or split.    nitrofurantoin (MACRODANTIN) 100 mg, oral, Every 12 hours    ondansetron ODT (Zofran-ODT) 4 mg disintegrating tablet 1-2 tablets, oral, Every 6 hours PRN    OneTouch Ultra Test strip USE AS DIRECTED 3 TIMES A DAY    risperiDONE (RisperDAL) 0.5 mg tablet Take one tab twice daily for one week then increase to two tabs twice daily    rosuvastatin (Crestor) 10 mg tablet 1 tablet, oral, Nightly    rosuvastatin (CRESTOR) 40 mg, oral, Daily    SITagliptin phosphate (Januvia) 100 mg tablet 1 tablet, oral, Daily       Allergies:  Allergies   Allergen Reactions    Ace Inhibitors Unknown and Angioedema    Pioglitazone Shortness of breath    Atorvastatin Other     Muscle weakness    Flu Vac 2023 [id number](Pf) Swelling    Influenza Virus Vaccines Unknown    Sulfa (Sulfonamide Antibiotics)  Unknown       Past medical history:  Past Medical History:   Diagnosis Date    Age-related nuclear cataract, unspecified eye 11/30/2016    Nuclear sclerosis    Cellulitis of head (any part, except face) 11/26/2014    Abscess or cellulitis of scalp    Disorder of the skin and subcutaneous tissue, unspecified 04/19/2016    Skin lesion    Essential (primary) hypertension 12/15/2022    Hypertension    Myopia, bilateral 01/09/2015    Bilateral myopia    Other specified personal risk factors, not elsewhere classified     At risk of UTI    Pelvic and perineal pain     Pelvic pain    Personal history of diseases of the skin and subcutaneous tissue 04/19/2016    History of furuncle    Personal history of other diseases of the nervous system and sense organs 11/30/2016    History of keratitis    Personal history of other mental and behavioral disorders 12/29/2020    History of anxiety    Presbyopia 01/09/2015    Presbyopia OU    Primary open-angle glaucoma, unspecified eye, stage unspecified 05/18/2016    Glaucoma primary, open angle    Type 2 diabetes mellitus without complications (Multi) 12/15/2022    Diabetes mellitus    Unspecified abdominal pain 05/02/2017    Abdominal cramps       Surgical history:  No past surgical history on file.    Family history:  Family History   Problem Relation Name Age of Onset    Alcohol abuse Mother      Diabetes Other Grandmother        Social history:   reports that she has quit smoking. Her smoking use included cigarettes. She has never used smokeless tobacco. She reports that she does not currently use alcohol. She reports that she does not currently use drugs.    Health maintenance:  Health Maintenance   Topic Date Due    Medicare Annual Wellness Visit (AWV)  Never done    Zoster Vaccines (1 of 2) Never done    DTaP/Tdap/Td Vaccines (1 - Tdap) 07/15/2008    Pneumococcal Vaccine: 65+ Years (2 of 2 - PCV) 11/05/2016    RSV High Risk: (Elderly (60+) or Pregnant Population) (1 - 1-dose 75+  "series) Never done    Diabetes: Urine Protein Screening  08/21/2024    Influenza Vaccine (1) 09/01/2024    COVID-19 Vaccine (1 - 2024-25 season) Never done    Diabetes: Hemoglobin A1C  09/27/2024    Diabetes: Retinopathy Screening  03/05/2025    Lipid Panel  06/27/2025    Hepatitis C Screening  Completed    Bone Density Scan  Completed    HIB Vaccines  Aged Out    Hepatitis B Vaccines  Aged Out    IPV Vaccines  Aged Out    Hepatitis A Vaccines  Aged Out    Meningococcal Vaccine  Aged Out    Rotavirus Vaccines  Aged Out    HPV Vaccines  Aged Out       Sexual History  Currently Sexually Active: {YES (DEF)/NO:27917}  Partners: {Sexual History Gender List:33104}  LMP: {Sexual History Menstrual Hx:04007}  Contraception: {YES-DESCRIBE/NO:34404}  STI Concern/Hx: {STI SP:93949}      Review of systems:  Review of Systems     Vitals:  There were no vitals filed for this visit.    Physical exam:  Physical Exam    Labs:  Lab Results   Component Value Date    WBC 5.7 04/10/2020    HGB 13.6 04/10/2020    HCT 38.5 04/10/2020    MCV 89 04/10/2020     04/10/2020       Lab Results   Component Value Date    GLUCOSE 136 (H) 06/27/2024    CALCIUM 9.7 06/27/2024     06/27/2024    K 4.4 06/27/2024    CO2 25 06/27/2024     06/27/2024    BUN 14 06/27/2024    CREATININE 0.92 06/27/2024       Lab Results   Component Value Date    HGBA1C 7.9 (H) 06/27/2024        Lab Results   Component Value Date    CHOL 288 (H) 06/27/2024    CHOL 322 (H) 08/21/2023    CHOL 285 (H) 04/11/2023     Lab Results   Component Value Date    HDL 63.3 06/27/2024    HDL 67.9 08/21/2023    HDL 74.5 04/11/2023     Lab Results   Component Value Date    LDLCALC 204 (H) 06/27/2024     Lab Results   Component Value Date    TRIG 106 06/27/2024    TRIG 135 08/21/2023    TRIG 94 04/11/2023     No components found for: \"CHOLHDL\"    Imaging:  BI mammo bilateral screening tomosynthesis    Result Date: 10/20/2024  Interpreted By:  Geovanna Owen, STUDY: BI MAMMO " BILATERAL SCREENING TOMOSYNTHESIS;  10/15/2024 3:33 pm   ACCESSION NUMBER(S): [id number]   ORDERING CLINICIAN: DURAN HOSKINS   INDICATION: Screening.   ,Z12.31 Encounter for screening mammogram for malignant neoplasm of breast   COMPARISON: 04/14/2014.   FINDINGS: 2D and tomosynthesis images were reviewed at 1 mm slice thickness. Best possible images.   Density:  The breasts are almost entirely fatty.   No suspicious masses or calcifications are identified.       No mammographic evidence of malignancy.   BI-RADS CATEGORY: BI-RADS Category:  1 Negative. Recommendation:  Annual Screening. Recommended Date:  1 Year. Laterality:  Bilateral.       For any future breast imaging appointments, please call [phone] ([phone]).     MACRO: None   Signed by: Geovanna Owen 10/20/2024 6:37 PM Dictation workstation:   [id number]      Assessment and plan:  Lou Mata is a 79 y.o. female with pmh of *** presenting with ***    #Schizophrenia  -Hallucinations are directed towards her, talking about her but no command hallucinations.   Medication  Start risperidone 0.5 mg one tablet twice daily for one week, then increase to two tablets daily  Decrease Abilify to 15 mg daily for hallucinations  Follow up: October 8th 3:00     #Stress incontinence  -Follows up with urology  Estrace cream   -Kegel excer  -    #DM  Metformin 1000 mg with Breakfast and 500 mg with dinner  Januvia 100 mg OD  Glimepiride 4 mg before Breakfast and 4mg before dinner Sometimes forget the evening meds   -Eye exam: Apt in March 2024 No retinopathy. Next month has cataract removal  Foot exam/podiatrist: foot clinic seen this year last in October   Cardiovascular: no coronary artery bypass graft and no coronary artery disease. LDL: 227 in August 2023 and rosuvastatin was   Renal: UACR positive in August 2023 and no end stage renal disease. Had angioedema  Neurologic: no neuropathy    Rosuvastatin 40 mg once daily   Follow with ophthalmology and podiatry      #HT  -Amlodipine 10 mg once a day     #Mons several inclusion cysts  -I&D scheduled         HEALTH MAINTENANCE   Antibody Testing   HIV: negative ***  Syphilis: ***   Hepatitis C: ***   Vaccines  Influenza: ***  Shingles: *** >51yo 2 doses 2-6mo apart  Tdap: ***  Pneumonia: *** adult <65 w/ risk factors (heart/liver/lung disease; smoking, diabetes) OR >65 unknown vaccine hx PCV 20 -> 1yr -> PPSV 23; PPSV23 -> 1yr -> PCV 20   COVID: ***  Cancer screening   Colonoscopy: *** age 45-76yo   Pap Smear: *** (21-29 q3 yrs; 30-65 w/HPV q5 yrs if no HPV q3 yrs)   Mammogram: *** (ACOG rec age 40, USPSTF age 50: shared decision making 40-50 then q1-2 yrs until age 74 and then shared decision making)   Low dose Chest CT: *** age 50-80 20yr smoking hx current OR quit w/in last 15yrs yearly   DEXA scan: *** females >65 or <65 hx of hip fracture   Plan     Follow-up in ***.    Patient and plan discussed with attending physician {[facility] attendings:[id number]}.    Kimberley Rosales MD

## 2024-10-25 ENCOUNTER — APPOINTMENT (OUTPATIENT)
Dept: PRIMARY CARE | Facility: HOSPITAL | Age: 79
End: 2024-10-25
Payer: COMMERCIAL

## 2024-10-28 ENCOUNTER — APPOINTMENT (OUTPATIENT)
Dept: OBSTETRICS AND GYNECOLOGY | Facility: CLINIC | Age: 79
End: 2024-10-28
Payer: COMMERCIAL

## 2024-11-11 ENCOUNTER — APPOINTMENT (OUTPATIENT)
Dept: ENDOCRINOLOGY | Facility: CLINIC | Age: 79
End: 2024-11-11
Payer: COMMERCIAL

## 2024-11-22 ENCOUNTER — APPOINTMENT (OUTPATIENT)
Dept: OBSTETRICS AND GYNECOLOGY | Facility: CLINIC | Age: 79
End: 2024-11-22
Payer: COMMERCIAL

## 2024-12-10 ENCOUNTER — APPOINTMENT (OUTPATIENT)
Dept: BEHAVIORAL HEALTH | Facility: CLINIC | Age: 79
End: 2024-12-10
Payer: COMMERCIAL

## 2024-12-16 ENCOUNTER — APPOINTMENT (OUTPATIENT)
Dept: OPHTHALMOLOGY | Facility: CLINIC | Age: 79
End: 2024-12-16
Payer: COMMERCIAL

## 2024-12-19 ENCOUNTER — APPOINTMENT (OUTPATIENT)
Dept: OPHTHALMOLOGY | Facility: CLINIC | Age: 79
End: 2024-12-19
Payer: COMMERCIAL

## 2025-01-08 ENCOUNTER — APPOINTMENT (OUTPATIENT)
Dept: OPHTHALMOLOGY | Facility: CLINIC | Age: 80
End: 2025-01-08
Payer: COMMERCIAL

## 2025-01-09 ENCOUNTER — APPOINTMENT (OUTPATIENT)
Dept: OBSTETRICS AND GYNECOLOGY | Facility: CLINIC | Age: 80
End: 2025-01-09
Payer: COMMERCIAL

## 2025-01-13 ENCOUNTER — APPOINTMENT (OUTPATIENT)
Dept: OBSTETRICS AND GYNECOLOGY | Facility: CLINIC | Age: 80
End: 2025-01-13
Payer: COMMERCIAL

## 2025-01-20 ENCOUNTER — APPOINTMENT (OUTPATIENT)
Dept: OPHTHALMOLOGY | Facility: CLINIC | Age: 80
End: 2025-01-20
Payer: COMMERCIAL

## 2025-01-21 ENCOUNTER — APPOINTMENT (OUTPATIENT)
Dept: BEHAVIORAL HEALTH | Facility: CLINIC | Age: 80
End: 2025-01-21
Payer: COMMERCIAL

## 2025-01-22 ENCOUNTER — APPOINTMENT (OUTPATIENT)
Dept: ENDOCRINOLOGY | Facility: CLINIC | Age: 80
End: 2025-01-22
Payer: COMMERCIAL

## 2025-02-04 ENCOUNTER — APPOINTMENT (OUTPATIENT)
Dept: BEHAVIORAL HEALTH | Facility: CLINIC | Age: 80
End: 2025-02-04
Payer: COMMERCIAL

## 2025-02-05 ENCOUNTER — APPOINTMENT (OUTPATIENT)
Dept: BEHAVIORAL HEALTH | Facility: CLINIC | Age: 80
End: 2025-02-05
Payer: COMMERCIAL

## 2025-02-24 ENCOUNTER — APPOINTMENT (OUTPATIENT)
Dept: OPHTHALMOLOGY | Facility: CLINIC | Age: 80
End: 2025-02-24
Payer: COMMERCIAL

## 2025-03-05 ENCOUNTER — APPOINTMENT (OUTPATIENT)
Dept: OPHTHALMOLOGY | Facility: CLINIC | Age: 80
End: 2025-03-05
Payer: COMMERCIAL

## 2025-03-10 ENCOUNTER — APPOINTMENT (OUTPATIENT)
Dept: ENDOCRINOLOGY | Facility: CLINIC | Age: 80
End: 2025-03-10
Payer: COMMERCIAL

## 2025-03-10 VITALS
BODY MASS INDEX: 32.59 KG/M2 | WEIGHT: 166 LBS | HEART RATE: 94 BPM | DIASTOLIC BLOOD PRESSURE: 65 MMHG | SYSTOLIC BLOOD PRESSURE: 137 MMHG | HEIGHT: 60 IN

## 2025-03-10 DIAGNOSIS — E11.9 TYPE 2 DIABETES MELLITUS WITHOUT COMPLICATION, UNSPECIFIED WHETHER LONG TERM INSULIN USE: ICD-10-CM

## 2025-03-10 DIAGNOSIS — E11.9 DIABETES MELLITUS TYPE 2 WITHOUT RETINOPATHY (MULTI): ICD-10-CM

## 2025-03-10 DIAGNOSIS — E78.2 MIXED HYPERLIPIDEMIA: Primary | ICD-10-CM

## 2025-03-10 LAB — POC HEMOGLOBIN A1C: 8.3 % (ref 4.2–6.5)

## 2025-03-10 PROCEDURE — 3075F SYST BP GE 130 - 139MM HG: CPT | Performed by: STUDENT IN AN ORGANIZED HEALTH CARE EDUCATION/TRAINING PROGRAM

## 2025-03-10 PROCEDURE — 99213 OFFICE O/P EST LOW 20 MIN: CPT | Performed by: STUDENT IN AN ORGANIZED HEALTH CARE EDUCATION/TRAINING PROGRAM

## 2025-03-10 PROCEDURE — 3078F DIAST BP <80 MM HG: CPT | Performed by: STUDENT IN AN ORGANIZED HEALTH CARE EDUCATION/TRAINING PROGRAM

## 2025-03-10 PROCEDURE — 1159F MED LIST DOCD IN RCRD: CPT | Performed by: STUDENT IN AN ORGANIZED HEALTH CARE EDUCATION/TRAINING PROGRAM

## 2025-03-10 PROCEDURE — 83036 HEMOGLOBIN GLYCOSYLATED A1C: CPT | Performed by: STUDENT IN AN ORGANIZED HEALTH CARE EDUCATION/TRAINING PROGRAM

## 2025-03-10 PROCEDURE — 1126F AMNT PAIN NOTED NONE PRSNT: CPT | Performed by: STUDENT IN AN ORGANIZED HEALTH CARE EDUCATION/TRAINING PROGRAM

## 2025-03-10 RX ORDER — GLIMEPIRIDE 4 MG/1
4 TABLET ORAL 2 TIMES DAILY
Qty: 180 TABLET | Refills: 3 | Status: SHIPPED | OUTPATIENT
Start: 2025-03-10

## 2025-03-10 RX ORDER — ROSUVASTATIN CALCIUM 20 MG/1
20 TABLET, COATED ORAL DAILY
Qty: 30 TABLET | Refills: 11 | Status: SHIPPED | OUTPATIENT
Start: 2025-03-10 | End: 2026-03-10

## 2025-03-10 RX ORDER — METFORMIN HYDROCHLORIDE 500 MG/1
1000 TABLET, EXTENDED RELEASE ORAL
Qty: 360 TABLET | Refills: 3 | Status: SHIPPED | OUTPATIENT
Start: 2025-03-10 | End: 2026-03-10

## 2025-03-10 ASSESSMENT — PAIN SCALES - GENERAL: PAINLEVEL_OUTOF10: 0-NO PAIN

## 2025-03-10 NOTE — PATIENT INSTRUCTIONS
Please adjust diet decrease on sugar (Juice and candy)  Increase Metformin to 1000 mg in am and 1000 mg with dinner   Continue Januvia 100 mg once daily  Continue Glimepiride 4 mg before breakfast and 4 mg before dinner  If any low BG less than 70 please let me know  If blood sugar not improving either Jardiance   -- Continue to check BG twice daily  -- Follow with ophthalmology and podiatry  -- Restart Rosuvastatin 20 mg once daily  -- Continue Amlodipine 10 mg once a day     Blood work before next apt    RTC in 4 months

## 2025-03-10 NOTE — PROGRESS NOTES
Patient coming in for follow up for T2DM    Subjective   Lou Mata is a 79 y.o. female who presents for follow up for Type 2 diabetes mellitus.   Lab Results   Component Value Date    HGBA1C 7.9 (H) 06/27/2024      Mrs. Mata is a 79 year old F with hx of HTN, T2DM and HLD coming in for follow up.  date of diagnosis: years ago . Date of last HbA1c: 3/10/2025 8.3%  Januvia 100 mg daily was added in December  Was seen in December and MEtformin was increased to 1000 mg BId but patient is taking 1000-500  Ucx positive in April  In August patient called was having Diarrhea so we held Metfromin and she restarted 1000 mg in amd 500 in the evening  Current DM Regimen:.   Metformin 1000 mg with Breakfast and 500 mg with dinner  Januvia 100 mg OD  Glimepiride 4 mg before Breakfast and 4mg before dinner Sometimes forget the evening meds     In am  In the afternoon: 113  Before dinner goes up to 200s  Rare low BG  Diet:   Breakfast: Oat meal, egg, sousa and Toast   Lunch: Hot dog with a glass a juice  Dinner: Licona beans, spinach, chicken  Snack: Popcorn before dinner. Ice cream every now and then  Chocolate.  Diabetes Surveillance:   Eye exam: Apt in March 2024 No retinopathy. Next month has cataract removal  Foot exam/podiatrist: foot clinic seen this year last in October   Cardiovascular: no coronary artery bypass graft and no coronary artery disease. Restarted on rosuvastatin last visit. LDL: 204 prior to rosuvastatin   Renal: UACR positive in August 2023 and no end stage renal disease. Had angioedema  Neurologic: no neuropathy.      Occasional abdominal pain when she has to go to the bathroom    Review of Systems  all pertinent systems reviewed and are otherwise negative   Objective   Visit Vitals  /65 (BP Location: Right arm, Patient Position: Sitting, BP Cuff Size: Large adult)   Pulse 94   Ht 1.524 m (5')   Wt 75.3 kg (166 lb)   BMI 32.42 kg/m²   OB Status Hysterectomy   Smoking Status Former   BSA 1.79 m²       Physical Exam  Constitutional:       General: She is not in acute distress.     Appearance: Normal appearance. She is obese.   HENT:      Head: Normocephalic and atraumatic.   Eyes:      Extraocular Movements: Extraocular movements intact.      Pupils: Pupils are equal, round, and reactive to light.   Cardiovascular:      Rate and Rhythm: Normal rate and regular rhythm.   Pulmonary:      Effort: Pulmonary effort is normal. No respiratory distress.      Breath sounds: Normal breath sounds.   Abdominal:      General: Bowel sounds are normal.      Palpations: Abdomen is soft.      Tenderness: There is no abdominal tenderness.   Skin:     Coloration: Skin is not jaundiced or pale.      Findings: No erythema or rash.   Neurological:      General: No focal deficit present.      Mental Status: She is alert and oriented to person, place, and time.      Deep Tendon Reflexes: Reflexes normal.   Psychiatric:         Mood and Affect: Mood normal.         Behavior: Behavior normal.         Lab Review  Glucose (mg/dL)   Date Value   06/27/2024 136 (H)   08/21/2023 222 (H)   04/11/2023 159 (H)   12/15/2022 170 (H)     Hemoglobin A1C (%)   Date Value   06/27/2024 7.9 (H)   08/21/2023 9.4 (A)   04/11/2023 8.5 (A)   12/15/2022 9.8 (A)     Bicarbonate (mmol/L)   Date Value   06/27/2024 25   08/21/2023 26   04/11/2023 30   12/15/2022 25     Urea Nitrogen (mg/dL)   Date Value   06/27/2024 14   08/21/2023 16   04/11/2023 14   12/15/2022 10     Creatinine (mg/dL)   Date Value   06/27/2024 0.92   08/21/2023 0.88   04/11/2023 0.82   12/15/2022 0.76     Lab Results   Component Value Date    CHOL 288 (H) 06/27/2024    CHOL 322 (H) 08/21/2023    CHOL 285 (H) 04/11/2023     Lab Results   Component Value Date    HDL 63.3 06/27/2024    HDL 67.9 08/21/2023    HDL 74.5 04/11/2023     Lab Results   Component Value Date    LDLCALC 204 (H) 06/27/2024     Lab Results   Component Value Date    TRIG 106 06/27/2024    TRIG 135 08/21/2023    TRIG 94  "04/11/2023     No components found for: \"CHOLHDL\"   Lab Results   Component Value Date    TSH 2.01 04/26/2022     No results found for: \"ALBUR\", \"[id number]\"     Health Maintenance:       Assessment/Plan   Mrs. Mata is a 79 year old F with hx of HTN, T2DM and HLD coming in for follow up.  date of diagnosis: years ago . Date of last HbA1c: 3/10/2025 8.3%  Januvia 100 mg daily was added in December  Was seen in December and MEtformin was increased to 1000 mg BId but patient is taking 1000-500  Ucx positive in April  In August patient called was having Diarrhea so we held Metfromin and she restarted 1000 mg in amd 500 in the evening  Current DM Regimen:.   Metformin 1000 mg with Breakfast and 500 mg with dinner  Januvia 100 mg OD  Glimepiride 4 mg before Breakfast and 4mg before dinner Sometimes forget the evening meds     In am  In the afternoon: 113  Before dinner goes up to 200s  Rare low BG  Diabetes Surveillance:   Eye exam: Apt in March 2024 No retinopathy. Next month has cataract removal  Foot exam/podiatrist: foot clinic seen this year last in October   Cardiovascular: no coronary artery bypass graft and no coronary artery disease. Restarted on rosuvastatin last visit. LDL: 204 prior to rosuvastatin   Renal: UACR positive in August 2023 and no end stage renal disease. Had angioedema  Neurologic: no neuropathy.    Plan:  Please adjust diet decrease on sugar (Juice and candy)  Increase Metformin to 1000 mg in am and 1000 mg with dinner   Continue Januvia 100 mg once daily  Continue Glimepiride 4 mg before breakfast and 4 mg before dinner  If any low BG less than 70 please let me know  If blood sugar not improving either Jardiance   -- Continue to check BG twice daily  -- Follow with ophthalmology and podiatry  -- Restart Rosuvastatin 20 mg once daily  -- Continue Amlodipine 10 mg once a day     Blood work before next apt    RTC in 4 months  Assessment & Plan  Type 2 diabetes mellitus without complication, " unspecified whether long term insulin use    Orders:    POCT glycosylated hemoglobin (Hb A1C) manually resulted    rosuvastatin (Crestor) 20 mg tablet; Take 1 tablet (20 mg) by mouth once daily.    glimepiride (Amaryl) 4 mg tablet; Take 1 tablet (4 mg) by mouth 2 times a day.    Renal Function Panel; Future    Lipid Panel; Future    Albumin-Creatinine Ratio, Urine Random; Future    Hemoglobin A1C; Future    Follow Up In Endocrinology; Future    Diabetes mellitus type 2 without retinopathy (Multi)    Orders:    metFORMIN XR (Glucophage-XR) 500 mg 24 hr tablet; Take 2 tablets (1,000 mg) by mouth 2 times daily (morning and late afternoon). Do not crush, chew, or split.    SITagliptin phosphate (Januvia) 100 mg tablet; Take 1 tablet (100 mg) by mouth once daily.    Follow Up In Endocrinology; Future    Mixed hyperlipidemia    Orders:    rosuvastatin (Crestor) 20 mg tablet; Take 1 tablet (20 mg) by mouth once daily.    Lipid Panel; Future    Follow Up In Endocrinology; Future

## 2025-03-17 ENCOUNTER — APPOINTMENT (OUTPATIENT)
Dept: UROLOGY | Facility: CLINIC | Age: 80
End: 2025-03-17
Payer: COMMERCIAL

## 2025-03-19 ENCOUNTER — APPOINTMENT (OUTPATIENT)
Dept: UROLOGY | Facility: CLINIC | Age: 80
End: 2025-03-19
Payer: COMMERCIAL

## 2025-03-19 VITALS — TEMPERATURE: 96.7 F | HEIGHT: 60 IN | WEIGHT: 166 LBS | BODY MASS INDEX: 32.59 KG/M2

## 2025-03-19 DIAGNOSIS — R39.9 UTI SYMPTOMS: Primary | ICD-10-CM

## 2025-03-19 DIAGNOSIS — N39.0 RECURRENT UTI: ICD-10-CM

## 2025-03-19 DIAGNOSIS — R35.0 FREQUENT URINATION: ICD-10-CM

## 2025-03-19 PROCEDURE — 1159F MED LIST DOCD IN RCRD: CPT | Performed by: NURSE PRACTITIONER

## 2025-03-19 PROCEDURE — 99213 OFFICE O/P EST LOW 20 MIN: CPT | Performed by: NURSE PRACTITIONER

## 2025-03-19 PROCEDURE — 1160F RVW MEDS BY RX/DR IN RCRD: CPT | Performed by: NURSE PRACTITIONER

## 2025-03-19 NOTE — PROGRESS NOTES
[facility]  Outpatient Clinic Note    Subjective   Lou Mata is a 79 y.o. female    History of Present Illness   Patient presenting to clinic today for annual FUV. History of gross hematuria and recurrent UTI. Today's visit patient bothersome. She denies urgency, fevers, chills, n/v, or flank pain. She is drinking mostly Pepsi throughout the day. Occasional BRODY, minimal leakage. No gross hematuria. Complaint of vaginal dryness. No recent UTI     Lab Results   Component Value Date    URINECULTURE No significant growth 09/16/2024    URINECULTURE NO SIGNIFICANT GROWTH. 09/15/2023    URINECULTURE Escherichia coli (A) 05/17/2023       Past Medical History and Surgical History   Past Medical History:   Diagnosis Date    Age-related nuclear cataract, unspecified eye 11/30/2016    Nuclear sclerosis    Cellulitis of head (any part, except face) 11/26/2014    Abscess or cellulitis of scalp    Disorder of the skin and subcutaneous tissue, unspecified 04/19/2016    Skin lesion    Essential (primary) hypertension 12/15/2022    Hypertension    Myopia, bilateral 01/09/2015    Bilateral myopia    Other specified personal risk factors, not elsewhere classified     At risk of UTI    Pelvic and perineal pain     Pelvic pain    Personal history of diseases of the skin and subcutaneous tissue 04/19/2016    History of furuncle    Personal history of other diseases of the nervous system and sense organs 11/30/2016    History of keratitis    Personal history of other mental and behavioral disorders 12/29/2020    History of anxiety    Presbyopia 01/09/2015    Presbyopia OU    Primary open-angle glaucoma, unspecified eye, stage unspecified 05/18/2016    Glaucoma primary, open angle    Type 2 diabetes mellitus without complications (Multi) 12/15/2022    Diabetes mellitus    Unspecified abdominal pain 05/02/2017    Abdominal cramps     No past surgical history on file.    Medications  Current Outpatient Medications on File Prior to  Visit   Medication Sig Dispense Refill    albuterol 90 mcg/actuation inhaler Inhale 2 puffs every 4 hours if needed for shortness of breath. (Patient not taking: Reported on 3/10/2025)      amLODIPine (Norvasc) 10 mg tablet TAKE 1 TABLET BY MOUTH EVERY DAY 90 tablet 3    ARIPiprazole (Abilify) 15 mg tablet Take 1 tablet (15 mg) by mouth once daily. (Patient taking differently: Take 2 tablets (30 mg) by mouth once daily.) 30 tablet 0    aspirin 81 mg EC tablet Take 1 tablet (81 mg) by mouth once daily.      benzoyl peroxide 5 % lotion 1 Application. (Patient not taking: Reported on 3/10/2025)      doxycycline (Vibra-Tabs) 100 mg tablet 1 tablet (100 mg). (Patient not taking: Reported on 3/10/2025)      estradiol (Estrace) 0.01 % (0.1 mg/gram) vaginal cream Apply pea size amount (1 g) nightly for 2 weeks, then 2 times per week. (Patient not taking: Reported on 3/10/2025) 42.5 g 3    glimepiride (Amaryl) 4 mg tablet Take 1 tablet (4 mg) by mouth 2 times a day. 180 tablet 3    glipiZIDE (Glucotrol) 5 mg tablet Take 2 tablets (10 mg) by mouth twice a day. (Patient not taking: Reported on 3/10/2025)      hydroCHLOROthiazide (HYDRODiuril) 25 mg tablet Take 1 tablet (25 mg) by mouth once daily. (Patient not taking: Reported on 3/10/2025)      lancets (OneTouch UltraSoft Lancets) misc 3 times a day. Use to test blood sugar      Lumigan 0.01 % ophthalmic solution ADMINISTER 1 DROP INTO BOTH EYES ONCE DAILY AT BEDTIME.      metFORMIN XR (Glucophage-XR) 500 mg 24 hr tablet Take 2 tablets (1,000 mg) by mouth 2 times daily (morning and late afternoon). Do not crush, chew, or split. 360 tablet 3    nitrofurantoin (Macrodantin) 100 mg capsule Take 1 capsule (100 mg) by mouth every 12 hours. (Patient not taking: Reported on 3/10/2025)      ondansetron ODT (Zofran-ODT) 4 mg disintegrating tablet Take 1-2 tablets (4-8 mg) by mouth every 6 hours if needed for vomiting or nausea. (Patient not taking: Reported on 3/10/2025)      OneTouch  Ultra Test strip USE AS DIRECTED 3 TIMES A  strip 2    risperiDONE (RisperDAL) 0.5 mg tablet Take one tab twice daily for one week then increase to two tabs twice daily (Patient not taking: Reported on 3/10/2025) 120 tablet 0    rosuvastatin (Crestor) 20 mg tablet Take 1 tablet (20 mg) by mouth once daily. 30 tablet 11    SITagliptin phosphate (Januvia) 100 mg tablet Take 1 tablet (100 mg) by mouth once daily. 90 tablet 3     No current facility-administered medications on file prior to visit.       Objective   Physicial Exam  General: Well developed, well nourished, alert and cooperative, appears in no acute distress  Eyes: Non-injected conjunctiva, sclera clear, no proptosis  Cardiac: Extremities are warm and well perfused. No edema, cyanosis or pallor.   Lungs: Breathing is easy, non-labored. Speaking in clear and complete sentences. Normal diaphragmatic movement.  MSK: Ambulatory with steady gait, unassisted  Neuro: alert and oriented to person, place and time  Psych: Demonstrates good judgement and reason, without hallucinations, abnormal affect or abnormal behaviors.  Skin: no obvious lesions, no rashes.    No visits with results within 1 Day(s) from this visit.   Latest known visit with results is:   Office Visit on 03/10/2025   Component Date Value Ref Range Status    POC HEMOGLOBIN A1c 03/10/2025 8.3 (A)  4.2 - 6.5 % Final      Review of Systems  All other systems have been reviewed and are negative for complaint.      Assessment and Plan     I discussed incontinence is secondary to weak pelvic floor muscles. I advised initiating Kegel exercises since leakage is minimal     I discussed fluid management and timed voiding. I discussed oral treatment with anticholinergics. She declines Continue with behavioral modifications.     We discussed UTI prevention in great detail. It is recommended to increase daily water intake to at least 80 ounces per day. It is important to have smooth, daily bowel  movements and good bowel health. If you are not, you may take Miralax 17g daily to help with bowel movements. Do not take this if you are having watery or loose stools. You may consider taking a cranberry supplement or D-Mannose daily to prevent bacteria from adhering to your bladder wall. I recommend taking a daily probiotic for good vaginal lupe health.     Will Continue with Estrace cream, explained. She denies personal history of breast or gynecological cancer. I explained that small amounts of estrogen can be detected in the blood with use of Estrace cream and that the patient should follow up regularly for breast and gynecological cancer screening.     RTC in 6 months, sooner if needed.     All questions and concerns were addressed. Patient verbalizes understanding and has no other questions at this time.     Nati Rankin-- ALESIA WINTERS  Office Phone:  [phone]

## 2025-03-20 ENCOUNTER — APPOINTMENT (OUTPATIENT)
Dept: OBSTETRICS AND GYNECOLOGY | Facility: CLINIC | Age: 80
End: 2025-03-20
Payer: COMMERCIAL

## 2025-03-20 LAB
BACTERIA UR CULT: NORMAL
COLOR UR: NORMAL

## 2025-03-29 NOTE — ASSESSMENT & PLAN NOTE
Orders:    metFORMIN XR (Glucophage-XR) 500 mg 24 hr tablet; Take 2 tablets (1,000 mg) by mouth 2 times daily (morning and late afternoon). Do not crush, chew, or split.    SITagliptin phosphate (Januvia) 100 mg tablet; Take 1 tablet (100 mg) by mouth once daily.    Follow Up In Endocrinology; Future

## 2025-03-29 NOTE — ASSESSMENT & PLAN NOTE
Orders:    rosuvastatin (Crestor) 20 mg tablet; Take 1 tablet (20 mg) by mouth once daily.    Lipid Panel; Future    Follow Up In Endocrinology; Future

## 2025-03-29 NOTE — ASSESSMENT & PLAN NOTE
Orders:    POCT glycosylated hemoglobin (Hb A1C) manually resulted    rosuvastatin (Crestor) 20 mg tablet; Take 1 tablet (20 mg) by mouth once daily.    glimepiride (Amaryl) 4 mg tablet; Take 1 tablet (4 mg) by mouth 2 times a day.    Renal Function Panel; Future    Lipid Panel; Future    Albumin-Creatinine Ratio, Urine Random; Future    Hemoglobin A1C; Future    Follow Up In Endocrinology; Future

## 2025-03-31 ENCOUNTER — APPOINTMENT (OUTPATIENT)
Dept: BEHAVIORAL HEALTH | Facility: CLINIC | Age: 80
End: 2025-03-31
Payer: COMMERCIAL

## 2025-04-01 ENCOUNTER — APPOINTMENT (OUTPATIENT)
Dept: PRIMARY CARE | Facility: CLINIC | Age: 80
End: 2025-04-01
Payer: COMMERCIAL

## 2025-04-01 VITALS
SYSTOLIC BLOOD PRESSURE: 103 MMHG | HEIGHT: 60 IN | DIASTOLIC BLOOD PRESSURE: 82 MMHG | WEIGHT: 167 LBS | BODY MASS INDEX: 32.79 KG/M2 | HEART RATE: 92 BPM

## 2025-04-01 DIAGNOSIS — Z12.11 COLON CANCER SCREENING: Primary | ICD-10-CM

## 2025-04-01 DIAGNOSIS — I10 PRIMARY HYPERTENSION: ICD-10-CM

## 2025-04-01 PROCEDURE — 99203 OFFICE O/P NEW LOW 30 MIN: CPT | Performed by: FAMILY MEDICINE

## 2025-04-01 PROCEDURE — 1036F TOBACCO NON-USER: CPT | Performed by: FAMILY MEDICINE

## 2025-04-01 PROCEDURE — 3074F SYST BP LT 130 MM HG: CPT | Performed by: FAMILY MEDICINE

## 2025-04-01 PROCEDURE — 1159F MED LIST DOCD IN RCRD: CPT | Performed by: FAMILY MEDICINE

## 2025-04-01 PROCEDURE — 3079F DIAST BP 80-89 MM HG: CPT | Performed by: FAMILY MEDICINE

## 2025-04-01 RX ORDER — AMLODIPINE BESYLATE 10 MG/1
10 TABLET ORAL DAILY
Qty: 90 TABLET | Refills: 3 | Status: SHIPPED | OUTPATIENT
Start: 2025-04-01

## 2025-04-01 ASSESSMENT — ENCOUNTER SYMPTOMS
DEPRESSION: 0
LOSS OF SENSATION IN FEET: 0
OCCASIONAL FEELINGS OF UNSTEADINESS: 0

## 2025-04-01 NOTE — PROGRESS NOTES
Pt is here to est care, concerns of back pain, leg pain, hip pain, butt pain, stiffness, thigh pain.           Chief Complaint:  No chief complaint on file.  History Of Present Illness:   Lou Mata is a 79 y.o. female     New pt      HTN- norvasc.   - not checking bp at home.           Lower back and buttock pain for about a week- went away recently.      DM- sees endo  - on crestor        Schizophrenia- sees psych           Healthcare team:  - pt will establish with new NP that begins here.   - urology- recurrent UTI  - endocrinology- DM  - ophth  - mental health- [facility]. Schizophrenia.   - GYN          Surgical History: hysterectomy, tubal pregnancy     Social History:  Living situation- house rental through [facility]. Lives alone.   Work- retired, former , , .  School- none  Alcohol- rare  Illicit Substances- none. Former crack user- none for 16 years, used to go to meetings  Diet- normal    Tobacco  Packs per day/years/quit date- quit 5 years ago. Former 1ppd for 15 years    Family History:  Cardiac- none  Cancer- pgm lung cancer      Immunizations:  Influenza- declines due to allergy  Tdap (every 10 years)- pt states up to date  Zoster (Shingrix)  (Age 50 or older: set of 2 shingrix, second is administered 2-6 months after first shingrix)  Pneumococcal (PCV 20)  (Over age 50; or age 19-49 with predisposing chronic medical conditions)      Patient is aware that they will need to go to local pharmacy to get immunizations that are not offered in clinic.          Health Maintenance   - Colonoscopy (45-75)- never   - Cologuard- years ago, agreed to reorder 2025.     Mammogram (female, 40+)- 2024- benign          GYN: has a GYN.      Mood: history of schizophrenia- mood is good     Sleep: not too good     Sports/Exercise: tries to eat right, sometimes she'll walk around the block. Often sits around watching tv.         Review of Systems (BOLD if positive,  delete if not asked):     Constitutional:   - fever   - chills   - night sweats  - unexpected weight change       Eyes:   - loss of vision  - double vision  - floaters     Ear/Nose/Throat/Mouth:   - hearing changes  - sore throat  - sinus congestion     Cardiovascular:   - chest pain  - chest heaviness  - palpitations  - swelling in ankles       Respiratory:   - shortness of breath  - difficulty breathing  - frequent cough  - wheezing    Musculoskeletal:   - bone pain  - muscle pain  - joint pain   -low back pain       Neurological:   - headache  - loss of consciousness  - tremors  - dizzy spells  - numbness   - tingling       Gastrointestinal:   - abdominal pain  - nausea  - vomiting  - constipation  - diarrhea  - bloody stools  - loss of bowel control  - heartburn       Genitourinary:   - urinary incontinence  - painful urination  - blood in urine     Skin:   - Rash  - lumps or bumps  - worrisome moles     Endocrine:   - excessive thirst  - feeling too hot  - feeling too cold  - fatigue    Hematologic/Lymphatic:   - swollen glands  - blood clotting problems  - easy bruising           Psychological:   - feelings of depression  - feelings of anxiety                  Psychological:   - feeling generally happy  - feeling safe at home          Last Recorded Vitals:  Vitals:    04/01/25 0930   BP: (!) 170/96   Pulse: 101   Weight: 75.8 kg (167 lb)   Height: 1.524 m (5')        Past Medical History:  She has a past medical history of Age-related nuclear cataract, unspecified eye (11/30/2016), Cellulitis of head (any part, except face) (11/26/2014), Disorder of the skin and subcutaneous tissue, unspecified (04/19/2016), Essential (primary) hypertension (12/15/2022), Myopia, bilateral (01/09/2015), Other specified personal risk factors, not elsewhere classified, Pelvic and perineal pain, Personal history of diseases of the skin and subcutaneous tissue (04/19/2016), Personal history of other diseases of the nervous system  and sense organs (11/30/2016), Personal history of other mental and behavioral disorders (12/29/2020), Presbyopia (01/09/2015), Primary open-angle glaucoma, unspecified eye, stage unspecified (05/18/2016), Type 2 diabetes mellitus without complications (12/15/2022), and Unspecified abdominal pain (05/02/2017).     Past Surgical History:  She has no past surgical history on file.     Social History:  She reports that she has quit smoking. Her smoking use included cigarettes. She has never used smokeless tobacco. She reports that she does not currently use alcohol. She reports that she does not currently use drugs.     Family History:  Family History   Problem Relation Name Age of Onset    Alcohol abuse Mother      Diabetes Other Grandmother      Allergies:  Ace inhibitors, Pioglitazone, Atorvastatin, Flu vac 2023 [id number](pf), Influenza virus vaccines, and Sulfa (sulfonamide antibiotics)     Outpatient Medications:  Current Outpatient Medications   Medication Instructions    amLODIPine (NORVASC) 10 mg, oral, Daily    ARIPiprazole (ABILIFY) 15 mg, oral, Daily    benzoyl peroxide 5 % lotion 1 Application    estradiol (Estrace) 0.01 % (0.1 mg/gram) vaginal cream Apply pea size amount (1 g) nightly for 2 weeks, then 2 times per week.    glimepiride (AMARYL) 4 mg, oral, 2 times daily    lancets (OneTouch UltraSoft Lancets) misc 3 times daily    Lumigan 0.01 % ophthalmic solution ADMINISTER 1 DROP INTO BOTH EYES ONCE DAILY AT BEDTIME.    metFORMIN XR (GLUCOPHAGE-XR) 1,000 mg, oral, 2 times daily (morning and late afternoon), Do not crush, chew, or split.    OneTouch Ultra Test strip USE AS DIRECTED 3 TIMES A DAY    rosuvastatin (CRESTOR) 20 mg, oral, Daily    SITagliptin phosphate (JANUVIA) 100 mg, oral, Daily        Physical Exam:  GENERAL: Well developed, well nourished, alert and cooperative, and appears to be in no acute distress.     PSYCH: mood pleasant and appropriate     HEAD: normocephalic     EYES: PERRL,  EOMI. vision is grossly intact.       NOSE:  Nares patent b/l.   No bleeding nasal polyps. No nasal discharge.     THROAT:    Oropharynx clear.            CARDIAC:   RRR.   No murmur.       LUNGS: Good respiratory effort.  Clear to auscultation b/l without rales, rhonchi, wheezing.     ABD: soft, nontender       GAIT: Normal          BACK: No gross spinal deformity on inspection.   No spinous process ttp in C-spine,   No spinous process ttp in T-spine,  No spinous process ttp in L-spine       EXTREMITIES: All 4 extremities are warm and well perfused.   Peripheral pulses intact.   No varicosities.   No cyanosis, no pallor.   No peripheral edema.            Last Labs:  CBC -  Lab Results   Component Value Date    WBC 5.7 04/10/2020    HGB 13.6 04/10/2020    HCT 38.5 04/10/2020    MCV 89 04/10/2020     04/10/2020        CMP -  Lab Results   Component Value Date    CALCIUM 9.7 06/27/2024    PHOS 3.6 06/27/2024    PROT 7.8 04/11/2023    ALBUMIN 4.4 06/27/2024    AST 15 04/11/2023    ALT 15 04/11/2023    ALKPHOS 101 04/11/2023    BILITOT 0.3 04/11/2023        LIPID PANEL -  Lab Results   Component Value Date    CHOL 288 (H) 06/27/2024    TRIG 106 06/27/2024    HDL 63.3 06/27/2024    CHHDL 4.5 06/27/2024    LDLF 227 (H) 08/21/2023    VLDL 21 06/27/2024    NHDL 225 (H) 06/27/2024           Lab Results   Component Value Date    BNP 11 04/10/2020    HGBA1C 8.3 (A) 03/10/2025          BI mammo bilateral screening tomosynthesis  Narrative: Interpreted By:  Geovanna Owen,   STUDY:  BI MAMMO BILATERAL SCREENING TOMOSYNTHESIS;  10/15/2024 3:33 pm      ACCESSION NUMBER(S):  [id number]      ORDERING CLINICIAN:  DURAN HOSKINS      INDICATION:  Screening.      ,Z12.31 Encounter for screening mammogram for malignant neoplasm of  breast      COMPARISON:  04/14/2014.      FINDINGS:  2D and tomosynthesis images were reviewed at 1 mm slice thickness.  Best possible images.      Density:  The breasts are almost entirely fatty.     "  No suspicious masses or calcifications are identified.      Impression: No mammographic evidence of malignancy.      BI-RADS CATEGORY:  BI-RADS Category:  1 Negative.  Recommendation:  Annual Screening.  Recommended Date:  1 Year.  Laterality:  Bilateral.              For any future breast imaging appointments, please call [phone]  ([phone]).          MACRO:  None      Signed by: Geovanna Owen 10/20/2024 6:37 PM  Dictation workstation:   [id number]         Assessment/Plan   Problem List Items Addressed This Visit             ICD-10-CM    Hypertension I10        Problem focused blood pressure visit today.  Bp are good today in office, refilling amlodipine.         Nice to meet you in the office today.       Make sure that you are established with a GYN provider for routine OB/GYN care.       Referrals and advanced imaging scheduling line: [phone].  Another scheduling number is: [phone].  Another potential phone number is: [phone] ([phone]).  The number for pediatric referrals is: [phone].      Recommend checking your blood pressures at home. Ideally \"120/80\" is a good blood pressure, but <140/<90 is a reasonable goal.  If you are relaxed while checking your blood pressures at home and they are higher than 140 for the top number, call the office and we may need to discuss changing your medications, or seeing you in the office sooner.  Please bring in some recorded home blood pressures when you come back to the office.   A list of devices to consider can be found at: [email or web address]://[email or web address].org/devices      Cologuard ordered      No work note needed.        Pt reports they will follow up with the new PCP that begins in this office    - follow up in 4-6 months         Vinicius Oakes, DO  "

## 2025-04-15 LAB — NONINV COLON CA DNA+OCC BLD SCRN STL QL: NEGATIVE

## 2025-04-17 ENCOUNTER — OFFICE VISIT (OUTPATIENT)
Dept: PRIMARY CARE | Facility: HOSPITAL | Age: 80
End: 2025-04-17
Payer: COMMERCIAL

## 2025-04-17 VITALS
SYSTOLIC BLOOD PRESSURE: 145 MMHG | HEIGHT: 60 IN | HEART RATE: 99 BPM | TEMPERATURE: 96.3 F | DIASTOLIC BLOOD PRESSURE: 89 MMHG | BODY MASS INDEX: 32 KG/M2 | WEIGHT: 163 LBS | OXYGEN SATURATION: 99 %

## 2025-04-17 DIAGNOSIS — R31.21 ASYMPTOMATIC MICROSCOPIC HEMATURIA: Primary | ICD-10-CM

## 2025-04-17 DIAGNOSIS — R21 RASH AND OTHER NONSPECIFIC SKIN ERUPTION: ICD-10-CM

## 2025-04-17 LAB — GLUCOSE BLD MANUAL STRIP-MCNC: 211 MG/DL (ref 74–99)

## 2025-04-17 PROCEDURE — 82947 ASSAY GLUCOSE BLOOD QUANT: CPT

## 2025-04-17 RX ORDER — HYDROCORTISONE 25 MG/G
CREAM TOPICAL 2 TIMES DAILY PRN
Qty: 30 G | Refills: 11 | Status: SHIPPED | OUTPATIENT
Start: 2025-04-17 | End: 2026-04-17

## 2025-04-17 ASSESSMENT — ENCOUNTER SYMPTOMS
LOSS OF SENSATION IN FEET: 0
DEPRESSION: 0
OCCASIONAL FEELINGS OF UNSTEADINESS: 0

## 2025-04-17 ASSESSMENT — PATIENT HEALTH QUESTIONNAIRE - PHQ9
2. FEELING DOWN, DEPRESSED OR HOPELESS: NOT AT ALL
1. LITTLE INTEREST OR PLEASURE IN DOING THINGS: NOT AT ALL
SUM OF ALL RESPONSES TO PHQ9 QUESTIONS 1 AND 2: 0

## 2025-04-17 ASSESSMENT — PAIN SCALES - GENERAL: PAINLEVEL_OUTOF10: 0-NO PAIN

## 2025-04-17 NOTE — PATIENT INSTRUCTIONS
Dear Ms. Adolfo,     You came to the clinic due to a rash you noticed on your arms, back, and legs. Although the rash does not appear severe at this time, I will order a low potency steroid cream that you can use twice daily and we will continue to monitor. Continue taking your Crestor a this time.     Please incorporate daily exercises to help build strength and improve joint pain. We also discussed cutting down on salty foods and using healthier spice alternatives for taste. I reordered your urinalysis to reassess microscopic blood in urine. At your earliest convenience please visit the lab to get your UA done and remaining blood work. Thank you!     Sincerely,   Your  [facility] Team

## 2025-04-17 NOTE — PROGRESS NOTES
[facility]    HPI:  Lou Mata is a 79 y.o. female with PMH of T2DM, HLD, and HTN presents with a rash. Noticed rash on arms, legs and back. She's noticed it since last year but it's gotten worst over winter. She describes the rash as itchy and burning. She says burning is a 2/10. She changed her detergent, soap and lotion in an attempt to resolve rash but it has remained. She decided to come to the clinic because it had gotten worst yesterday. She says the rash went from dark marks to red spots. Denies diet changes, no recent travel, has not been exposed to anyone with similar rash. She lives at home with no pets. She denies recent changes in medications. Notices dry skin after taking metformin, she noticed this association over the past 3 or 4 months. Wants to incorporate more exercise by taking walks later. She eats a diet high in sodium and expressed desire to cut down on salty foods and replace diet with healthy proteins and vegetables. At last urology appt, pt reports that urolologist noted blood in her urine although she has never noticed that herself. Noted joint pain during her last PCP appt on 4/1, pain has resolved with aleve. Denies CP, SOB, LH, dizziness, n/v/d, constipation, urinary changes.       Health maintenance:  Health Maintenance   Topic Date Due    Yearly Adult Physical  Never done    Zoster Vaccines (1 of 2) Never done    DTaP/Tdap/Td Vaccines (1 - Tdap) 07/15/2008    Pneumococcal Vaccine (2 of 2 - PCV) 11/05/2016    RSV High Risk: (Elderly (60+) or Pregnant Population) (1 - 1-dose 75+ series) Never done    Diabetes: Urine Protein Screening  08/21/2024    COVID-19 Vaccine (1 - 2024-25 season) Never done    Diabetes: Hemoglobin A1C  06/10/2025    Lipid Panel  06/27/2025    Influenza Vaccine (Season Ended) 09/01/2025    Diabetes: Retinopathy Screening  03/05/2026    Hepatitis C Screening  Completed    Bone Density Scan  Completed    HIB Vaccines  Aged Out    Hepatitis  B Vaccines  Aged Out    IPV Vaccines  Aged Out    Hepatitis A Vaccines  Aged Out    Meningococcal Vaccine  Aged Out    Rotavirus Vaccines  Aged Out    HPV Vaccines  Aged Out       Medications:  Current Medications[1]    Allergies:  RX Allergies[2]    Past medical history:  Medical History[3]    Surgical history:  Surgical History[4]    Family history:  Family History[5]    Social history:   reports that she has quit smoking. Her smoking use included cigarettes. She has never used smokeless tobacco. She reports that she does not currently use alcohol. She reports that she does not currently use drugs.    Social History     Social History Narrative    Not on file       Review of systems:  Constitutional: negative for fevers, chills, weight loss, weight gain, change in appetite, fatigue, weakness.  HEENT: negative for headache, changes in vision or hearing, congestion, sore throat.  Respiratory: negative for SOB, cough, hemoptysis, wheezing  Cardiovascular: negative for chest pain, palpitations, orthopnea, PND  GI: negative for dysphagia, abdominal pain, nausea, vomiting, diarrhea, constipation, melena, hematochezia, BRBPR  : negative for frequency, urgency, dysuria, hematuria, incontinence  MSK: negative for myalgia, arthralgia, decreased joint ROM, LE swelling  Skin: negative for rash, wounds  Heme/lymph: negative for easy bruising, bleeding, epistaxis  Neuro: negative for LOC, numbness, tingling, tremor, vertigo, dizziness    Vitals:  Vitals:    04/17/25 1407   BP: 145/89   Pulse: 99   Temp: 35.7 °C (96.3 °F)   SpO2: 99%       Physical exam:  Constitutional: Well-developed female in no acute distress.  HEENT: Normocephalic, atraumatic. PERRL. EOMI. No cervical lymphadenopathy.  Respiratory: CTA bilaterally. No wheezes, rales, or rhonchi. Normal respiratory effort.  Cardiovascular: RRR. No murmurs, gallops, or rubs. No JVD. Radial pulses 2+.  Abdominal: Soft, nondistended, nontender to palpation. Bowel sounds  present. No hepatosplenomegaly or masses. No CVA tenderness.  Neuro: CN II-XII intact. UE and LE strength 5/5 bilaterally and sensation intact. Normal FTN testing.  MSK: No LE edema bilaterally. Small, fine linear excoriations overlying dry skin noted on back, UE and LE  Skin: Warm, dry. No rashes or wounds.  Psych: Appropriate mood and affect.    Labs:  Results for orders placed or performed in visit on 04/17/25 (from the past 24 hours)   POCT GLUCOSE   Result Value Ref Range    POCT Glucose 211 (H) 74 - 99 mg/dL       Imaging:  Imaging  No results found.    Cardiology, Vascular, and Other Imaging  No other imaging results found for the past 7 days      Assessment and Plan:  Lou Mata is a 79 y.o. female with PMH HTN, HLD, and T2DM, presenting for rash. Rash could be associated with dry skin that seems to worsen with metformin. Rash hasn't resolved with any routine changes. Plan to order topical steroids and continue to monitor for improvement. Patient denies taking Crestor. She will resume taking medication as instructed. She says recent joint pains have improved with Aleve, counseled on the importance of diet and exercise. Expressed commitment to walking daily and reducing sodium intake over all. Urologist ordered UA 4/25 due to microscopic hematuria noted in September 2024, however it was not complete. Reordered UA at this visit. Patient will get basic blood work and UA today.     #Rash   ::Seems to be associated with dry skin   - Ordered topical hydrocortisone cream BID     #HLD  :: Pt has not been taking Crestor as instructed, oftentimes she forgets to take it with the rest of her medications  - C/w Crestor 20 mg     #HTN  - C/w amlodipine     #T2DM  ::HbA1c 3/10/25, 8.3%  ::Pt reports dry skin associated with metformin   - C/w metformin, Januvia, Amaryl     #Schizophrenia   - C/w abilify 30 mg     # Health maintenance  - Influenza: declines due to episode of facial swelling that lead to hospitalization  -  Tdap: patient declines       Follow-up in 6 months.    Patient and plan discussed with attending physician Dr. James.    Nori Parekh MD  Internal Medicine PGY-1  [facility]          [1]   Current Outpatient Medications:     amLODIPine (Norvasc) 10 mg tablet, Take 1 tablet (10 mg) by mouth once daily., Disp: 90 tablet, Rfl: 3    ARIPiprazole (Abilify) 15 mg tablet, Take 1 tablet (15 mg) by mouth once daily. (Patient taking differently: Take 2 tablets (30 mg) by mouth once daily.), Disp: 30 tablet, Rfl: 0    glimepiride (Amaryl) 4 mg tablet, Take 1 tablet (4 mg) by mouth 2 times a day., Disp: 180 tablet, Rfl: 3    lancets (OneTouch UltraSoft Lancets) misc, 3 times a day. Use to test blood sugar, Disp: , Rfl:     Lumigan 0.01 % ophthalmic solution, ADMINISTER 1 DROP INTO BOTH EYES ONCE DAILY AT BEDTIME., Disp: , Rfl:     metFORMIN XR (Glucophage-XR) 500 mg 24 hr tablet, Take 2 tablets (1,000 mg) by mouth 2 times daily (morning and late afternoon). Do not crush, chew, or split., Disp: 360 tablet, Rfl: 3    OneTouch Ultra Test strip, USE AS DIRECTED 3 TIMES A DAY, Disp: 300 strip, Rfl: 2    rosuvastatin (Crestor) 20 mg tablet, Take 1 tablet (20 mg) by mouth once daily., Disp: 30 tablet, Rfl: 11    SITagliptin phosphate (Januvia) 100 mg tablet, Take 1 tablet (100 mg) by mouth once daily., Disp: 90 tablet, Rfl: 3  [2]   Allergies  Allergen Reactions    Ace Inhibitors Unknown and Angioedema    Pioglitazone Shortness of breath    Atorvastatin Other     Muscle weakness    Flu Vac 2023 [id number](Pf) Swelling    Influenza Virus Vaccines Unknown    Sulfa (Sulfonamide Antibiotics) Unknown   [3]   Past Medical History:  Diagnosis Date    Age-related nuclear cataract, unspecified eye 11/30/2016    Nuclear sclerosis    Cellulitis of head (any part, except face) 11/26/2014    Abscess or cellulitis of scalp    Disorder of the skin and subcutaneous tissue, unspecified 04/19/2016    Skin lesion     Essential (primary) hypertension 12/15/2022    Hypertension    Myopia, bilateral 01/09/2015    Bilateral myopia    Other specified personal risk factors, not elsewhere classified     At risk of UTI    Pelvic and perineal pain     Pelvic pain    Personal history of diseases of the skin and subcutaneous tissue 04/19/2016    History of furuncle    Personal history of other diseases of the nervous system and sense organs 11/30/2016    History of keratitis    Personal history of other mental and behavioral disorders 12/29/2020    History of anxiety    Presbyopia 01/09/2015    Presbyopia OU    Primary open-angle glaucoma, unspecified eye, stage unspecified 05/18/2016    Glaucoma primary, open angle    Type 2 diabetes mellitus without complications 12/15/2022    Diabetes mellitus    Unspecified abdominal pain 05/02/2017    Abdominal cramps   [4] No past surgical history on file.  [5]   Family History  Problem Relation Name Age of Onset    Alcohol abuse Mother      Diabetes Other Grandmother

## 2025-04-18 ENCOUNTER — TELEPHONE (OUTPATIENT)
Dept: PRIMARY CARE | Facility: HOSPITAL | Age: 80
End: 2025-04-18
Payer: COMMERCIAL

## 2025-04-19 LAB
APPEARANCE UR: ABNORMAL
BACTERIA #/AREA URNS HPF: ABNORMAL /HPF
BACTERIA UR CULT: ABNORMAL
BACTERIA UR CULT: ABNORMAL
BILIRUB UR QL STRIP: NEGATIVE
COLOR UR: YELLOW
GLUCOSE UR QL STRIP: NEGATIVE
HGB UR QL STRIP: ABNORMAL
HYALINE CASTS #/AREA URNS LPF: ABNORMAL /LPF
KETONES UR QL STRIP: NEGATIVE
LEUKOCYTE ESTERASE UR QL STRIP: ABNORMAL
MEV IGG SER IA-ACNC: >300 AU/ML
NITRITE UR QL STRIP: NEGATIVE
PH UR STRIP: 5.5 [PH] (ref 5–8)
PROT UR QL STRIP: ABNORMAL
RBC #/AREA URNS HPF: ABNORMAL /HPF
SERVICE CMNT-IMP: ABNORMAL
SP GR UR STRIP: 1.01 (ref 1–1.03)
SQUAMOUS #/AREA URNS HPF: ABNORMAL /HPF
WBC #/AREA URNS HPF: ABNORMAL /HPF

## 2025-04-21 ENCOUNTER — TELEPHONE (OUTPATIENT)
Dept: PRIMARY CARE | Facility: HOSPITAL | Age: 80
End: 2025-04-21
Payer: COMMERCIAL

## 2025-04-21 DIAGNOSIS — N39.0 URINARY TRACT INFECTION WITHOUT HEMATURIA, SITE UNSPECIFIED: Primary | ICD-10-CM

## 2025-04-21 RX ORDER — AMOXICILLIN AND CLAVULANATE POTASSIUM 500; 125 MG/1; MG/1
1 TABLET, FILM COATED ORAL 2 TIMES DAILY
Qty: 10 TABLET | Refills: 0 | Status: SHIPPED | OUTPATIENT
Start: 2025-04-21 | End: 2025-04-26

## 2025-04-21 NOTE — TELEPHONE ENCOUNTER
Called patient to discuss UA positive for Klebsiella UTI, she reports darker pee unsure of blood, denies dysuria or other sx. Augmentin rx sent to pharmacy (pt has sulfa allergy and klebsiella is resistant to nitrofurantoin. Return precautions advised.

## 2025-04-28 ENCOUNTER — APPOINTMENT (OUTPATIENT)
Dept: OBSTETRICS AND GYNECOLOGY | Facility: CLINIC | Age: 80
End: 2025-04-28
Payer: COMMERCIAL

## 2025-05-03 ENCOUNTER — APPOINTMENT (OUTPATIENT)
Dept: DERMATOLOGY | Facility: CLINIC | Age: 80
End: 2025-05-03
Payer: COMMERCIAL

## 2025-05-07 ENCOUNTER — TELEPHONE (OUTPATIENT)
Dept: BEHAVIORAL HEALTH | Facility: CLINIC | Age: 80
End: 2025-05-07
Payer: COMMERCIAL

## 2025-05-14 ENCOUNTER — TELEPHONE (OUTPATIENT)
Dept: BEHAVIORAL HEALTH | Facility: CLINIC | Age: 80
End: 2025-05-14

## 2025-05-14 ENCOUNTER — APPOINTMENT (OUTPATIENT)
Dept: BEHAVIORAL HEALTH | Facility: CLINIC | Age: 80
End: 2025-05-14
Payer: COMMERCIAL

## 2025-05-14 VITALS
SYSTOLIC BLOOD PRESSURE: 133 MMHG | BODY MASS INDEX: 31.79 KG/M2 | HEART RATE: 100 BPM | WEIGHT: 162.8 LBS | DIASTOLIC BLOOD PRESSURE: 78 MMHG | RESPIRATION RATE: 18 BRPM | TEMPERATURE: 97.8 F

## 2025-05-14 DIAGNOSIS — F20.3 UNDIFFERENTIATED SCHIZOPHRENIA (MULTI): ICD-10-CM

## 2025-05-14 PROCEDURE — 99214 OFFICE O/P EST MOD 30 MIN: CPT

## 2025-05-14 PROCEDURE — 3075F SYST BP GE 130 - 139MM HG: CPT

## 2025-05-14 PROCEDURE — 1159F MED LIST DOCD IN RCRD: CPT

## 2025-05-14 PROCEDURE — 3078F DIAST BP <80 MM HG: CPT

## 2025-05-14 PROCEDURE — 1160F RVW MEDS BY RX/DR IN RCRD: CPT

## 2025-05-14 PROCEDURE — 1126F AMNT PAIN NOTED NONE PRSNT: CPT

## 2025-05-14 RX ORDER — ARIPIPRAZOLE 30 MG/1
30 TABLET ORAL DAILY
Qty: 90 TABLET | Refills: 0 | Status: SHIPPED | OUTPATIENT
Start: 2025-05-14

## 2025-05-14 RX ORDER — ARIPIPRAZOLE 30 MG/1
30 TABLET ORAL DAILY
Qty: 90 TABLET | Refills: 0 | Status: SHIPPED | OUTPATIENT
Start: 2025-05-14 | End: 2025-05-14

## 2025-05-14 ASSESSMENT — COLUMBIA-SUICIDE SEVERITY RATING SCALE - C-SSRS
ATTEMPT SINCE LAST CONTACT: NO
6. HAVE YOU EVER DONE ANYTHING, STARTED TO DO ANYTHING, OR PREPARED TO DO ANYTHING TO END YOUR LIFE?: NO
TOTAL  NUMBER OF ABORTED OR SELF INTERRUPTED ATTEMPTS SINCE LAST CONTACT: NO
ATTEMPT LIFETIME: NO
1. SINCE LAST CONTACT, HAVE YOU WISHED YOU WERE DEAD OR WISHED YOU COULD GO TO SLEEP AND NOT WAKE UP?: NO
2. HAVE YOU ACTUALLY HAD ANY THOUGHTS OF KILLING YOURSELF?: NO
2. HAVE YOU ACTUALLY HAD ANY THOUGHTS OF KILLING YOURSELF?: NO
TOTAL  NUMBER OF ABORTED OR SELF INTERRUPTED ATTEMPTS LIFETIME: NO
SUICIDE, SINCE LAST CONTACT: NO
TOTAL  NUMBER OF INTERRUPTED ATTEMPTS LIFETIME: NO
TOTAL  NUMBER OF INTERRUPTED ATTEMPTS SINCE LAST CONTACT: NO
6. HAVE YOU EVER DONE ANYTHING, STARTED TO DO ANYTHING, OR PREPARED TO DO ANYTHING TO END YOUR LIFE?: NO
1. HAVE YOU WISHED YOU WERE DEAD OR WISHED YOU COULD GO TO SLEEP AND NOT WAKE UP?: NO

## 2025-05-14 ASSESSMENT — PAIN SCALES - GENERAL: PAINLEVEL_OUTOF10: 0-NO PAIN

## 2025-05-14 NOTE — PROGRESS NOTES
Left VM with appointment for Annalisa Frances. Unable to email as patient does not have a computer.

## 2025-05-14 NOTE — PROGRESS NOTES
"Outpatient Psychiatry- Follow up visit    Subjective   Lou Mata, a 79 y.o. female, presenting for follow up visit for   Chief Complaint   Patient presents with    Schizophrenia        HPI:  Lou states things are \"OK\", was unable to start risperidone due to not being able to get it from the pharmacy. She remains on Abilify 30 mg daily.  States her tremor is not as bad currently.    Hallucinations were doing well but came back a couple days ago.  Lou did not have any since the beginning of the year prior to that. Hallucinations are all day and night, will keep her up.  Does not have them currently but notices them at home. Denies visual hallucinations. Has some paranoia that others are out to get her; is related to the voices in her head. Denies command hallucinations. Will distract herself with listening to spiritual music or reading her bible.     Mood was good prior to hallucinations restarting; but became anxious with them restarting. Denies lingering depressive sx, tries not to let them bother her too much. Denies SI/HI and passive thoughts of death.     Anxiety is good when she is away from the house. Realizes she needs to get out of the house more.     Sleep has been disrupted with the hallucinations, will get up if she cannot sleep.     Lou lives by herself, has three children that live in the [place] area but further away from Lou. She lives in [place] now.     Per previous HPI:  Lou states she has been \"okay\", not very good. Pt reports her biggest stressor is \"the voices\". They will come and go, irritating to her at times. She is hearing voices daily, she feels that she hears them when she gets upset. The voices start during the day and get less and less throughout the day. Pt describes them as chatter, not able to distinguish what they are saying. The voices are low. Lou will turn her radio on to a spiritual station to alleviate the voices.      Pt feels she can tolerate the voices she is " "hearing majority of the time. She expects to hear voices with her diagnosis. She has been forgetting to take her night time medication, this occurs a few times a week every week.     Pt has been getting spam calls on her phone, causing her to become upset. She is answering the calls with a greeting, they will not respond and will hang up. Pt will intermittently not answer the calls to avoid getting upset.     Pt reports tremors in her legs, standing and sitting. Pt denies it causing issues, does not effect sleep. Pt states she has some nerves and does not typically shake or tremor as much at home.    Pt reports mood over all, \"good\".  Pt denies any issues with sleeping, she goes to bed around 9/10 pm and wakes up around 5 am. Denies waking up throughout the night.     Pt states her appetite is good.     Psychiatric Review Of Systems:  Depressive Symptoms: negative  Manic Symptoms: negative  Anxiety Symptoms: General Anxiety Disorder (NATALIYA)NATALIYA Behaviors: Anxiety increased with hallucinations  Psychotic Symptoms: auditory hallucinations  Trauma Symptoms: None  Other Symptoms/Concerns:Other: (comments) none reported  Delirium/Altered Mental Status Symptoms:Other: (comment) WNL    Current Medications:    Current Outpatient Medications:     amLODIPine (Norvasc) 10 mg tablet, Take 1 tablet (10 mg) by mouth once daily., Disp: 90 tablet, Rfl: 3    ARIPiprazole (Abilify) 30 mg tablet, Take 1 tablet (30 mg) by mouth once daily., Disp: 90 tablet, Rfl: 0    glimepiride (Amaryl) 4 mg tablet, Take 1 tablet (4 mg) by mouth 2 times a day., Disp: 180 tablet, Rfl: 3    hydrocortisone 2.5 % cream, Apply topically 2 times a day as needed for irritation or rash., Disp: 30 g, Rfl: 11    lancets (OneTouch UltraSoft Lancets) misc, 3 times a day. Use to test blood sugar, Disp: , Rfl:     Lumigan 0.01 % ophthalmic solution, ADMINISTER 1 DROP INTO BOTH EYES ONCE DAILY AT BEDTIME., Disp: , Rfl:     metFORMIN XR (Glucophage-XR) 500 mg 24 hr " tablet, Take 2 tablets (1,000 mg) by mouth 2 times daily (morning and late afternoon). Do not crush, chew, or split., Disp: 360 tablet, Rfl: 3    OneTouch Ultra Test strip, USE AS DIRECTED 3 TIMES A DAY, Disp: 300 strip, Rfl: 2    rosuvastatin (Crestor) 20 mg tablet, Take 1 tablet (20 mg) by mouth once daily., Disp: 30 tablet, Rfl: 11    SITagliptin phosphate (Januvia) 100 mg tablet, Take 1 tablet (100 mg) by mouth once daily., Disp: 90 tablet, Rfl: 3    Medical History:  Past Medical History:   Diagnosis Date    Age-related nuclear cataract, unspecified eye 11/30/2016    Nuclear sclerosis    Cellulitis of head (any part, except face) 11/26/2014    Abscess or cellulitis of scalp    Disorder of the skin and subcutaneous tissue, unspecified 04/19/2016    Skin lesion    Essential (primary) hypertension 12/15/2022    Hypertension    Myopia, bilateral 01/09/2015    Bilateral myopia    Other specified personal risk factors, not elsewhere classified     At risk of UTI    Pelvic and perineal pain     Pelvic pain    Personal history of diseases of the skin and subcutaneous tissue 04/19/2016    History of furuncle    Personal history of other diseases of the nervous system and sense organs 11/30/2016    History of keratitis    Personal history of other mental and behavioral disorders 12/29/2020    History of anxiety    Presbyopia 01/09/2015    Presbyopia OU    Primary open-angle glaucoma, unspecified eye, stage unspecified 05/18/2016    Glaucoma primary, open angle    Type 2 diabetes mellitus without complications 12/15/2022    Diabetes mellitus    Unspecified abdominal pain 05/02/2017    Abdominal cramps       Past Psychiatric History:   Onset History: Schizophrenia approx 1986.   Inpatient history: 3-4 times for schizophrenia, last time approx 12 yrs ago.   Suicide attempts/Self-Harm/Ideation History: None   Current providers: None   Past providers: Dr Cha, others she cannot remember.   Past medication trials: Haldol,  "Caplyta (did not like), Abilify, Zyprexa, more she cannot remember.     Family psychiatric history:  Mother    · Family history of Alcohol abuse  Social History:   Upbringing: Born and raised in [place]. Moved to [place] when 13 yo. One older sister and one younger brother. Parents , childhood was \"up and down, parents fought\" got  when pt 4 yo.  Trauma: Denies hx of trauma  Education: Finished 11th grade, denies learning disabilities or developmental delays  Work: Worked various jobs; worked in a bank, laundry facility, office work  Marital Status:  but   Children: Three children, two female, one male  Living situation: Lives alone, rent apartment  : Denies  Legal: Denies  Access to Weapons: No firearms in household  Guardian/POA/Payee:  self    Substance Use History:  Tobacco use: former smoker; Started smoking in her 40's, quit 3 yrs ago. Highest use 1.5 PPD   Use of alcohol: denied; Used to drink heavily, slowed down in her 40's, stopped drinking 2-3 yrs ago.   Use of caffeine:   Use of other substances: denies; used to use illicit drugs, quit 15 yrs ago  Legal consequences of substance use: denies  Substance use disorder treatment: n/a    Record Review: brief     Medical Review Of Systems:  A comprehensive review of systems was negative.    MEDICAL HISTORY  -PCP: Dr ROBERTO Love-Varun  -TBI/head trauma/LOC/seizure hx: denies      Objective   Mental Status Exam  Appearance: Lou is wearing heavy clothing incongruent with the weather and a brimmed hat.  She is neat and clean in appearance  Attitude: Calm, cooperative, and engaged in conversation.  Behavior: Appropriate eye contact.   Motor Activity: No psychomotor agitation or retardation. Lou's tremor is improved from last visit, mild tremor in the legs noted.  No abnormal mouth or facial movements noted.  Speech: Regular rate, rhythm, volume. Spontaneous, no pressured speech.  Mood: \"Was good prior to the hallucinations " "starting\"  Affect: Slightly restricted, mood congruent.  Thought Process: Linear, logical, and goal-directed. No loose associations or gross thought disorganization.  Thought Content: Denied current suicidal ideation or thoughts of harm to self, denied homicidal ideation or thoughts of harm to others. No delusional thinking elicited. No perseverations or obsessions identified.   Perception: Lou has auditory hallucinations, denies visual hallucinations. did not appear to be responding to hallucinatory stimuli.   Cognition: Alert, oriented x3. Preserved attention span and concentration, recent and remote memory. Adequate fund of knowledge. No deficits in language.   Insight: Good, in regards to understanding mental health condition  Judgement: Intact    Vitals:  Vitals:    05/14/25 1445   BP: 133/78   Pulse: 100   Resp: 18   Temp: 36.6 °C (97.8 °F)     AIMS score: 2      Risk Assessment:  SI/HI ASSESSMENT  -Risk Assessment: Lou Mata is currently a low acute risk of suicide and self-harm due to no past suicide attempt(s) and not currently endorsing thoughts of suicide. Lou Mata is currently a low acute risk of violence and harm to others due to no past history of violence and not currently threatening others.  -Suicidal Risk Factors: unmarried/single, age <19 years and >65 years, living alone/lack of social support, and current psychiatric illness  -Violence Risk Factors: none  -Protective Factors: social support/connectedness, positive family relationships, hopefulness/future orientation, and no SI, no hx of SA, treatment compliant, willing to seek help.  -Plan to Reduce Risk: Establish medication regimen and outpatient follow-up care    Lou was seen today for schizophrenia.  Diagnoses and all orders for this visit:  Undifferentiated schizophrenia (Multi)  -     Discontinue: ARIPiprazole (Abilify) 30 mg tablet; Take 1 tablet (30 mg) by mouth once daily.  -     ARIPiprazole (Abilify) 30 mg tablet; Take 1 " tablet (30 mg) by mouth once daily.  -     Follow Up In Psychiatry; Future    Impression:  Lou was last seen by this provider in September 2024, where she was to switch to risperidone at that time.  However, Lou was not able to get risperidone from the pharmacy due to moving further away.  Hallucinations were improved, did not have any this year until recently.  Anxiety has increased with the return of the hallucinations.  She notices them mostly when at home.  Tremor is improved, mild tremor noted in her legs bilaterally.  Discussed treatment plan; due to Lou's age, will refer her to geriatric psychiatry to manage medication.  Discussed ways that Lou could get out of her home and engage socially with others to help with anxiety related to the hallucinations.  No follow-up visit scheduled at this time in lieu of referral to geriatric psychiatry.      Plan/Recommendations:  Medications:    - Continue Abilify 30 mg daily  Follow up: Someone will reach out to you to get scheduled for geriatric psychiatry  Call  Psychiatry at [phone] with issues.  For [facility] residents, [employer] is a 24/7 hotline you can call for assistance [[phone]]. Please call [phone]/224 or go to your closest Emergency Room if you feel unsafe. This includes thoughts of hurting yourself or anyone else, or having other troubles such as hearing voices, seeing visions, or having new and scary thoughts about the people around you.    Review with patient: Treatment plan reviewed with the patient.    Time Spent:  Prep time: 2 min  Direct patient time: 29 min  Documentation time: 5 min  Total time: 36 min    VIANEY Bansal-CNP

## 2025-05-18 NOTE — PATIENT INSTRUCTIONS
Plan/Recommendations:  Medications:    - Continue Abilify 30 mg daily  Follow up: Someone will reach out to you to get scheduled for geriatric psychiatry  Call  Psychiatry at [phone] with issues.  For [facility] residents, [employer] is a 24/7 hotline you can call for assistance [[phone]]. Please call [phone] or go to your closest Emergency Room if you feel unsafe. This includes thoughts of hurting yourself or anyone else, or having other troubles such as hearing voices, seeing visions, or having new and scary thoughts about the people around you.

## 2025-05-21 ENCOUNTER — TELEPHONE (OUTPATIENT)
Dept: PRIMARY CARE | Facility: HOSPITAL | Age: 80
End: 2025-05-21
Payer: COMMERCIAL

## 2025-05-21 DIAGNOSIS — R21 SKIN RASH: Primary | ICD-10-CM

## 2025-05-21 NOTE — TELEPHONE ENCOUNTER
Called to discuss with the patient regarding the blood test results. Patient was tested for Measles IgG on 4/17/25 which showed high positive IgG titer on Measles, consistent with immunity. Patient is reassured.    However, upon discussion, she still reported to have skin rash, itching and dry skin and would like to see dermatologist. Will schedule appointment with outpatient dermatology clinic

## 2025-06-02 ENCOUNTER — APPOINTMENT (OUTPATIENT)
Dept: OPHTHALMOLOGY | Facility: CLINIC | Age: 80
End: 2025-06-02
Payer: COMMERCIAL

## 2025-06-10 DIAGNOSIS — E11.9 TYPE 2 DIABETES MELLITUS WITHOUT COMPLICATION, UNSPECIFIED WHETHER LONG TERM INSULIN USE: ICD-10-CM

## 2025-06-10 DIAGNOSIS — E78.2 MIXED HYPERLIPIDEMIA: ICD-10-CM

## 2025-06-11 ENCOUNTER — APPOINTMENT (OUTPATIENT)
Dept: BEHAVIORAL HEALTH | Facility: CLINIC | Age: 80
End: 2025-06-11
Payer: COMMERCIAL

## 2025-06-17 ENCOUNTER — APPOINTMENT (OUTPATIENT)
Dept: OPHTHALMOLOGY | Facility: CLINIC | Age: 80
End: 2025-06-17
Payer: COMMERCIAL

## 2025-07-03 ENCOUNTER — APPOINTMENT (OUTPATIENT)
Dept: OBSTETRICS AND GYNECOLOGY | Facility: CLINIC | Age: 80
End: 2025-07-03
Payer: COMMERCIAL

## 2025-07-18 ENCOUNTER — APPOINTMENT (OUTPATIENT)
Dept: BEHAVIORAL HEALTH | Facility: CLINIC | Age: 80
End: 2025-07-18
Payer: COMMERCIAL

## 2025-07-21 ENCOUNTER — APPOINTMENT (OUTPATIENT)
Dept: ENDOCRINOLOGY | Facility: CLINIC | Age: 80
End: 2025-07-21
Payer: COMMERCIAL

## 2025-07-21 VITALS
BODY MASS INDEX: 31.41 KG/M2 | HEART RATE: 82 BPM | DIASTOLIC BLOOD PRESSURE: 74 MMHG | WEIGHT: 160 LBS | HEIGHT: 60 IN | SYSTOLIC BLOOD PRESSURE: 138 MMHG

## 2025-07-21 DIAGNOSIS — E11.9 DIABETES MELLITUS TYPE 2 WITHOUT RETINOPATHY (MULTI): ICD-10-CM

## 2025-07-21 DIAGNOSIS — E78.2 MIXED HYPERLIPIDEMIA: ICD-10-CM

## 2025-07-21 DIAGNOSIS — I10 PRIMARY HYPERTENSION: ICD-10-CM

## 2025-07-21 DIAGNOSIS — E11.9 TYPE 2 DIABETES MELLITUS WITHOUT COMPLICATION, UNSPECIFIED WHETHER LONG TERM INSULIN USE: ICD-10-CM

## 2025-07-21 LAB — POC HEMOGLOBIN A1C: 7.9 % (ref 4.2–6.5)

## 2025-07-21 PROCEDURE — 1159F MED LIST DOCD IN RCRD: CPT | Performed by: STUDENT IN AN ORGANIZED HEALTH CARE EDUCATION/TRAINING PROGRAM

## 2025-07-21 PROCEDURE — G2211 COMPLEX E/M VISIT ADD ON: HCPCS | Performed by: STUDENT IN AN ORGANIZED HEALTH CARE EDUCATION/TRAINING PROGRAM

## 2025-07-21 PROCEDURE — 83036 HEMOGLOBIN GLYCOSYLATED A1C: CPT | Performed by: STUDENT IN AN ORGANIZED HEALTH CARE EDUCATION/TRAINING PROGRAM

## 2025-07-21 PROCEDURE — 3078F DIAST BP <80 MM HG: CPT | Performed by: STUDENT IN AN ORGANIZED HEALTH CARE EDUCATION/TRAINING PROGRAM

## 2025-07-21 PROCEDURE — 3075F SYST BP GE 130 - 139MM HG: CPT | Performed by: STUDENT IN AN ORGANIZED HEALTH CARE EDUCATION/TRAINING PROGRAM

## 2025-07-21 PROCEDURE — 99214 OFFICE O/P EST MOD 30 MIN: CPT | Performed by: STUDENT IN AN ORGANIZED HEALTH CARE EDUCATION/TRAINING PROGRAM

## 2025-07-21 RX ORDER — AMLODIPINE BESYLATE 10 MG/1
10 TABLET ORAL DAILY
Qty: 90 TABLET | Refills: 3 | Status: SHIPPED | OUTPATIENT
Start: 2025-07-21

## 2025-07-21 RX ORDER — ACETAMINOPHEN 500 MG
TABLET ORAL
Qty: 1 KIT | Refills: 0 | Status: SHIPPED | OUTPATIENT
Start: 2025-07-21

## 2025-07-21 RX ORDER — LOSARTAN POTASSIUM 25 MG/1
25 TABLET ORAL DAILY
Qty: 90 TABLET | Refills: 3 | Status: SHIPPED | OUTPATIENT
Start: 2025-07-21 | End: 2026-07-21

## 2025-07-21 ASSESSMENT — PATIENT HEALTH QUESTIONNAIRE - PHQ9
1. LITTLE INTEREST OR PLEASURE IN DOING THINGS: NOT AT ALL
2. FEELING DOWN, DEPRESSED OR HOPELESS: NOT AT ALL
SUM OF ALL RESPONSES TO PHQ9 QUESTIONS 1 & 2: 0

## 2025-07-21 NOTE — ASSESSMENT & PLAN NOTE
Orders:    amLODIPine (Norvasc) 10 mg tablet; Take 1 tablet (10 mg) by mouth once daily.    losartan (Cozaar) 25 mg tablet; Take 1 tablet (25 mg) by mouth once daily.    blood pressure monitor kit; Check blood pressure twice daily

## 2025-07-21 NOTE — PATIENT INSTRUCTIONS
Metformin 1000 mg in am and 1000 mg with dinner   Continue Januvia 100 mg once daily  Continue Glimepiride 4 mg before breakfast and 4 mg before dinner  If any low BG less than 70 please let me know  -- Continue to check BG twice daily  -- Follow with ophthalmology and podiatry  -- Continue Rosuvastatin 20 mg once daily  -- Continue Amlodipine 10 mg once a day  -- Start losartan 25 mg daily  Monitor blood pressure at home if less than 100/60 let your PCP know     Blood work   RTC in 3 months

## 2025-07-21 NOTE — ASSESSMENT & PLAN NOTE
Orders:    Follow Up In Endocrinology    POCT glycosylated hemoglobin (Hb A1C) manually resulted    Referral to Podiatry; Future    amLODIPine (Norvasc) 10 mg tablet; Take 1 tablet (10 mg) by mouth once daily.    losartan (Cozaar) 25 mg tablet; Take 1 tablet (25 mg) by mouth once daily.    blood pressure monitor kit; Check blood pressure twice daily    Albumin-Creatinine Ratio, Urine Random; Future    Hemoglobin A1C; Future    Lipid Panel; Future    Renal Function Panel; Future

## 2025-07-21 NOTE — PROGRESS NOTES
"Patient coming in for follow up for T2DM    Subjective   Lou Mata is a 79 y.o. female who presents for follow up for Type 2 diabetes mellitus.   Lab Results   Component Value Date    HGBA1C 7.9 (A) 07/21/2025      Mrs. Mata is a 79 year old F with hx of HTN, T2DM and HLD coming in for follow up.  date of diagnosis: years ago . Date of last HbA1c: July 2025 7.9%  Lost 10 lbs since last September.  Current DM Regimen:.   Metformin 1000 mg with Breakfast and 500 mg with dinner  Januvia 100 mg OD  Glimepiride 4 mg before Breakfast and 4mg before dinner Sometimes forget the evening meds   BG       No low BG lowest 89  Diet:   Stopped having juice and candy  Breakfast:  egg, sousa and Toast with oat meal  Lunch: spaghetti or potatoes chicken and ground turkey  Dinner: [place] beans, spinach, chicken  Snack: Popcorn before dinner. Ice cream every now and then  Chocolate.  Diabetes Surveillance:   Eye exam: Apt in October 2025   Foot exam/podiatrist: foot clinic seen this year last in October   Cardiovascular: no coronary artery bypass graft and no coronary artery disease. Restarted on rosuvastatin last visit. LDL: 204 prior to rosuvastatin   Renal: UACR positive in August 2023 and no end stage renal disease. Had angioedema  Neurologic: UACR\" 32.8 in August 2023    Review of Systems  all pertinent systems reviewed and are otherwise negative   Objective   Visit Vitals  /74   Pulse 82   Ht (!) 1.524 m (5')   Wt 72.6 kg (160 lb)   BMI 31.25 kg/m²   OB Status Hysterectomy   Smoking Status Former   BSA 1.75 m²      Physical Exam  Constitutional:       General: She is not in acute distress.     Appearance: Normal appearance. She is obese.   HENT:      Head: Normocephalic and atraumatic.     Eyes:      Extraocular Movements: Extraocular movements intact.      Pupils: Pupils are equal, round, and reactive to light.       Cardiovascular:      Rate and Rhythm: Normal rate and regular rhythm.   Pulmonary:      Effort: " "Pulmonary effort is normal. No respiratory distress.      Breath sounds: Normal breath sounds.   Abdominal:      General: Bowel sounds are normal.      Palpations: Abdomen is soft.      Tenderness: There is no abdominal tenderness.     Skin:     Coloration: Skin is not jaundiced or pale.      Findings: No erythema or rash.     Neurological:      General: No focal deficit present.      Mental Status: She is alert and oriented to person, place, and time.      Deep Tendon Reflexes: Reflexes normal.     Psychiatric:         Mood and Affect: Mood normal.         Behavior: Behavior normal.         Lab Review  Glucose (mg/dL)   Date Value   06/27/2024 136 (H)   08/21/2023 222 (H)   04/11/2023 159 (H)   12/15/2022 170 (H)     POC HEMOGLOBIN A1c (%)   Date Value   07/21/2025 7.9 (A)   03/10/2025 8.3 (A)     Hemoglobin A1C (%)   Date Value   06/27/2024 7.9 (H)   08/21/2023 9.4 (A)   04/11/2023 8.5 (A)   12/15/2022 9.8 (A)     Bicarbonate (mmol/L)   Date Value   06/27/2024 25   08/21/2023 26   04/11/2023 30   12/15/2022 25     Urea Nitrogen (mg/dL)   Date Value   06/27/2024 14   08/21/2023 16   04/11/2023 14   12/15/2022 10     Creatinine (mg/dL)   Date Value   06/27/2024 0.92   08/21/2023 0.88   04/11/2023 0.82   12/15/2022 0.76     Lab Results   Component Value Date    CHOL 288 (H) 06/27/2024    CHOL 322 (H) 08/21/2023    CHOL 285 (H) 04/11/2023     Lab Results   Component Value Date    HDL 63.3 06/27/2024    HDL 67.9 08/21/2023    HDL 74.5 04/11/2023     Lab Results   Component Value Date    LDLCALC 204 (H) 06/27/2024     Lab Results   Component Value Date    TRIG 106 06/27/2024    TRIG 135 08/21/2023    TRIG 94 04/11/2023     No components found for: \"CHOLHDL\"   Lab Results   Component Value Date    TSH 2.01 04/26/2022     No results found for: \"ALBUR\", \"[id number]\"     Health Maintenance:       Assessment/Plan   Mrs. Mata is a 79 year old F with hx of HTN, T2DM and HLD coming in for follow up.  Date of last HbA1c: July " "2025 7.9%  Current DM Regimen:.   Metformin 1000 mg with Breakfast and 500 mg with dinner  Januvia 100 mg OD  Glimepiride 4 mg before Breakfast and 4mg before dinner Sometimes forget the evening meds   Diabetes Surveillance:   Eye exam: Apt in October 2025   Foot exam/podiatrist: foot clinic seen this year last in October   Cardiovascular: no coronary artery bypass graft and no coronary artery disease. Restarted on rosuvastatin last visit. LDL: 204 prior to rosuvastatin   Renal: UACR positive in August 2023 and no end stage renal disease. Had angioedema  Neurologic: UACR\" 32.8 in August 2023  Plan:  Metformin 1000 mg in am and 1000 mg with dinner   Continue Januvia 100 mg once daily  Continue Glimepiride 4 mg before breakfast and 4 mg before dinner  If any low BG less than 70 please let me know  -- Continue to check BG twice daily  -- Follow with ophthalmology and podiatry  -- Continue Rosuvastatin 20 mg once daily  -- Continue Amlodipine 10 mg once a day  -- Start losartan 25 mg daily  Monitor blood pressure at home if less than 100/60 let your PCP know     Blood work   RTC in 3 months  Assessment & Plan  Type 2 diabetes mellitus without complication, unspecified whether long term insulin use    Orders:    Follow Up In Endocrinology    POCT glycosylated hemoglobin (Hb A1C) manually resulted    Referral to Podiatry; Future    amLODIPine (Norvasc) 10 mg tablet; Take 1 tablet (10 mg) by mouth once daily.    losartan (Cozaar) 25 mg tablet; Take 1 tablet (25 mg) by mouth once daily.    blood pressure monitor kit; Check blood pressure twice daily    Albumin-Creatinine Ratio, Urine Random; Future    Hemoglobin A1C; Future    Lipid Panel; Future    Renal Function Panel; Future    Diabetes mellitus type 2 without retinopathy (Multi)    Orders:    Follow Up In Endocrinology    POCT glycosylated hemoglobin (Hb A1C) manually resulted    Referral to Podiatry; Future    amLODIPine (Norvasc) 10 mg tablet; Take 1 tablet (10 mg) by " mouth once daily.    losartan (Cozaar) 25 mg tablet; Take 1 tablet (25 mg) by mouth once daily.    blood pressure monitor kit; Check blood pressure twice daily    Albumin-Creatinine Ratio, Urine Random; Future    Hemoglobin A1C; Future    Lipid Panel; Future    Renal Function Panel; Future    Mixed hyperlipidemia    Orders:    Follow Up In Endocrinology    Lipid Panel; Future    Primary hypertension    Orders:    amLODIPine (Norvasc) 10 mg tablet; Take 1 tablet (10 mg) by mouth once daily.    losartan (Cozaar) 25 mg tablet; Take 1 tablet (25 mg) by mouth once daily.    blood pressure monitor kit; Check blood pressure twice daily

## 2025-07-29 ENCOUNTER — APPOINTMENT (OUTPATIENT)
Dept: DERMATOLOGY | Facility: CLINIC | Age: 80
End: 2025-07-29
Payer: MEDICARE

## 2025-07-29 DIAGNOSIS — L28.0 LICHENOID DERMATITIS: ICD-10-CM

## 2025-07-29 DIAGNOSIS — L30.9 DERMATITIS: Primary | ICD-10-CM

## 2025-07-29 PROCEDURE — 11104 PUNCH BX SKIN SINGLE LESION: CPT | Performed by: DERMATOLOGY

## 2025-07-29 PROCEDURE — 1159F MED LIST DOCD IN RCRD: CPT | Performed by: DERMATOLOGY

## 2025-07-29 PROCEDURE — 99204 OFFICE O/P NEW MOD 45 MIN: CPT | Performed by: DERMATOLOGY

## 2025-07-29 PROCEDURE — 87207 SMEAR SPECIAL STAIN: CPT | Performed by: DERMATOLOGY

## 2025-07-29 PROCEDURE — 1160F RVW MEDS BY RX/DR IN RCRD: CPT | Performed by: DERMATOLOGY

## 2025-07-29 RX ORDER — TRIAMCINOLONE ACETONIDE 1 MG/G
OINTMENT TOPICAL
Qty: 453.6 G | Refills: 0 | Status: SHIPPED | OUTPATIENT
Start: 2025-07-29

## 2025-07-29 ASSESSMENT — DERMATOLOGY PATIENT ASSESSMENT
DO YOU HAVE ANY NEW OR CHANGING LESIONS: YES
DO YOU HAVE IRREGULAR MENSTRUAL CYCLES: NO
HAVE YOU HAD OR DO YOU HAVE VASCULAR DISEASE: NO
ARE YOU TRYING TO GET PREGNANT: NO
HAVE YOU HAD OR DO YOU HAVE A STAPH INFECTION: NO
ARE YOU ON BIRTH CONTROL: NO
WHERE ARE THESE NEW OR CHANGING LESIONS LOCATED: ALL OVER
ARE YOU AN ORGAN TRANSPLANT RECIPIENT: NO
DO YOU USE A TANNING BED: NO

## 2025-07-29 ASSESSMENT — DERMATOLOGY QUALITY OF LIFE (QOL) ASSESSMENT
RATE HOW EMOTIONALLY BOTHERED YOU ARE BY YOUR SKIN PROBLEM (FOR EXAMPLE, WORRY, EMBARRASSMENT, FRUSTRATION): 2
ARE THERE EXCLUSIONS OR EXCEPTIONS FOR THE QUALITY OF LIFE ASSESSMENT: NO
DATE THE QUALITY-OF-LIFE ASSESSMENT WAS COMPLETED: 67415
RATE HOW BOTHERED YOU ARE BY EFFECTS OF YOUR SKIN PROBLEMS ON YOUR ACTIVITIES (EG, GOING OUT, ACCOMPLISHING WHAT YOU WANT, WORK ACTIVITIES OR YOUR RELATIONSHIPS WITH OTHERS): 2
RATE HOW BOTHERED YOU ARE BY SYMPTOMS OF YOUR SKIN PROBLEM (EG, ITCHING, STINGING BURNING, HURTING OR SKIN IRRITATION): 2

## 2025-07-29 ASSESSMENT — ITCH NUMERIC RATING SCALE: HOW SEVERE IS YOUR ITCHING?: 5

## 2025-07-29 ASSESSMENT — PATIENT GLOBAL ASSESSMENT (PGA): PATIENT GLOBAL ASSESSMENT: PATIENT GLOBAL ASSESSMENT:  1 - CLEAR

## 2025-07-29 NOTE — Clinical Note
Flat-topped pink to brown papules scattered mostly on dorsal arms but also on low back and to lesser degree shins. There is evidence of scratching on upper arms and upper back. Scabies prep performed and negative. Proceed to punch biopsy

## 2025-07-30 LAB — POC SCABIES - DERM RESULT 1: NORMAL

## 2025-07-30 NOTE — PROGRESS NOTES
Subjective     Lou Mata is a 79 y.o. female who presents for the following: Rash (All over). Last derm visit 8/2019 with Dr. Hall for hidradenitis suppurativa. Referral to derm placed 5/21/25 by Dr. James based on phone call of patient reporting rash    She has been itching for maybe 1 year, unsure. She has been using OTC creams and lotions. She does have a history of scabies years ago that resolved with a cream. She lives alone. No pets. No new meds.     Intake Questions  Do you have any new or changing Lesions?: Yes  Where are these new or changing lesion(s) located?: all over  Are you an organ transplant recipient?: No  Have you had or do you have a Staph Infection?: No  Have you had or do you have Vacular Disease?: No  Do you use sunscreen?: None  Do you use a tanning bed?: No  Are you trying to get pregnant?: No  Are you on birth control?: No  Do you have irregular menstrual cycles?: No    Review of Systems:  No other skin or systemic complaints other than what is documented elsewhere in the note.    The following portions of the chart were reviewed this encounter and updated as appropriate:   Tobacco  Allergies  Meds  Problems  Med Hx  Surg Hx         Skin Cancer History  Biopsy Log Book  No skin cancers from Specimen Tracking.    Additional History      Specialty Problems          Dermatology Problems    Keloid    Pseudofolliculitis barbae        Objective   Well appearing patient in no apparent distress; mood and affect are within normal limits.    A focused skin examination was performed. All findings within normal limits unless otherwise noted below.    Assessment/Plan   Skin Exam  1. DERMATITIS  Left Forearm - Posterior  Flat-topped pink to brown papules scattered mostly on dorsal arms but also on low back and to lesser degree shins. There is evidence of scratching on upper arms and upper back. Scabies prep performed and negative. Proceed to punch biopsy     - Skin biopsy - Left Forearm -  Posterior  Type of biopsy: punch    Informed consent: discussed and consent obtained    Timeout: patient name, date of birth, surgical site, and procedure verified    Procedure prep:  Patient was prepped and draped  Anesthesia: the lesion was anesthetized in a standard fashion    Anesthetic:  1% lidocaine w/ epinephrine 1-100,000 local infiltration  Punch size:  4 mm  Suture size:  5-0  Suture type: fast-absorbing plain gut    Hemostasis achieved with: suture    Outcome: patient tolerated procedure well    Post-procedure details: sterile dressing applied and wound care instructions given    Dressing type: bandage and petrolatum      - Staff Communication: Dermatology Local Anesthesia: Lidocaine 0.5% with Epinephrine 1;200,000 - Amount: 3 ml - Left Forearm - Posterior  - POCT Scabies Prep - DERM Manually Resulted - Left Forearm - Posterior  Specimen 1 - Dermatopathology- DERM LAB  Differential Diagnosis: lichenoid drug eruption vs follicular eczema vs scabies  Check Margins Yes/No?:    Comments:    Dermpath Lab: Routine Histopathology (formalin-fixed tissue)  2. LICHENOID DERMATITIS  Generalized  Empirically treat with topical steroid. Instructions for use provided  This Visit  - Follow Up In Dermatology - Established Patient  - triamcinolone (Kenalog) 0.1 % ointment - Apply to affected areas of itching on arms, legs, and trunk (not safe for face) twice daily for 2-4 weeks. Then decrease to 3x per week for maintenance    Follow up 1 month follow up and discussion of results

## 2025-07-31 ENCOUNTER — APPOINTMENT (OUTPATIENT)
Dept: OBSTETRICS AND GYNECOLOGY | Facility: CLINIC | Age: 80
End: 2025-07-31
Payer: COMMERCIAL

## 2025-08-01 LAB
LAB AP ASR DISCLAIMER: NORMAL
LABORATORY COMMENT REPORT: NORMAL
PATH REPORT.FINAL DX SPEC: NORMAL
PATH REPORT.GROSS SPEC: NORMAL
PATH REPORT.RELEVANT HX SPEC: NORMAL
PATH REPORT.TOTAL CANCER: NORMAL

## 2025-08-08 ENCOUNTER — APPOINTMENT (OUTPATIENT)
Dept: OBSTETRICS AND GYNECOLOGY | Facility: CLINIC | Age: 80
End: 2025-08-08
Payer: MEDICARE

## 2025-08-08 VITALS — WEIGHT: 160 LBS | BODY MASS INDEX: 31.41 KG/M2 | HEIGHT: 60 IN

## 2025-08-08 DIAGNOSIS — N90.89 VULVAL LESION: ICD-10-CM

## 2025-08-08 PROCEDURE — 99213 OFFICE O/P EST LOW 20 MIN: CPT | Performed by: OBSTETRICS & GYNECOLOGY

## 2025-08-08 PROCEDURE — 1159F MED LIST DOCD IN RCRD: CPT | Performed by: OBSTETRICS & GYNECOLOGY

## 2025-08-08 RX ORDER — BENZOYL PEROXIDE 50 MG/ML
LIQUID TOPICAL 2 TIMES DAILY
Qty: 236 ML | Refills: 1 | Status: SHIPPED | OUTPATIENT
Start: 2025-08-08 | End: 2026-08-08

## 2025-08-08 NOTE — PROGRESS NOTES
Patient here for follow-up of mons cyst  Patient has a new area that is bothering her with itching and soreness  Other cysts on her mons have been stable  She has been treated in the past with benzoyl peroxide solution  She would like a refill    Physical exam  EGBUS multiple raised cystic versus keloid lesions on mons, 1 smaller lesion on the right with scant discharge with expression    A/P: Chronic mons lesions  -Declines I&D  -Benzoyl peroxide solution twice daily as needed

## 2025-08-11 DIAGNOSIS — H40.1131 PRIMARY OPEN ANGLE GLAUCOMA (POAG) OF BOTH EYES, MILD STAGE: Primary | ICD-10-CM

## 2025-08-13 RX ORDER — BIMATOPROST 0.1 MG/ML
1 SOLUTION/ DROPS OPHTHALMIC
Qty: 7.5 ML | Refills: 1 | Status: SHIPPED | OUTPATIENT
Start: 2025-08-13

## 2025-08-27 ENCOUNTER — APPOINTMENT (OUTPATIENT)
Dept: DERMATOLOGY | Facility: CLINIC | Age: 80
End: 2025-08-27
Payer: MEDICARE

## 2025-09-04 ENCOUNTER — APPOINTMENT (OUTPATIENT)
Dept: PRIMARY CARE | Facility: HOSPITAL | Age: 80
End: 2025-09-04
Payer: MEDICARE

## 2025-09-04 ENCOUNTER — APPOINTMENT (OUTPATIENT)
Dept: DERMATOLOGY | Facility: CLINIC | Age: 80
End: 2025-09-04
Payer: COMMERCIAL

## 2025-09-09 ENCOUNTER — APPOINTMENT (OUTPATIENT)
Dept: PRIMARY CARE | Facility: HOSPITAL | Age: 80
End: 2025-09-09
Payer: MEDICARE

## 2025-09-19 ENCOUNTER — APPOINTMENT (OUTPATIENT)
Dept: UROLOGY | Facility: CLINIC | Age: 80
End: 2025-09-19
Payer: COMMERCIAL

## 2025-09-26 ENCOUNTER — APPOINTMENT (OUTPATIENT)
Dept: BEHAVIORAL HEALTH | Facility: CLINIC | Age: 80
End: 2025-09-26
Payer: MEDICARE

## 2025-10-13 ENCOUNTER — APPOINTMENT (OUTPATIENT)
Dept: OPHTHALMOLOGY | Facility: CLINIC | Age: 80
End: 2025-10-13
Payer: COMMERCIAL

## 2025-10-15 ENCOUNTER — APPOINTMENT (OUTPATIENT)
Dept: DERMATOLOGY | Facility: CLINIC | Age: 80
End: 2025-10-15
Payer: MEDICARE

## 2025-11-04 ENCOUNTER — APPOINTMENT (OUTPATIENT)
Dept: OPHTHALMOLOGY | Facility: CLINIC | Age: 80
End: 2025-11-04
Payer: COMMERCIAL

## 2026-02-23 ENCOUNTER — APPOINTMENT (OUTPATIENT)
Dept: DERMATOLOGY | Facility: HOSPITAL | Age: 81
End: 2026-02-23
Payer: MEDICARE